# Patient Record
Sex: MALE | Race: WHITE | NOT HISPANIC OR LATINO | Employment: UNEMPLOYED | URBAN - METROPOLITAN AREA
[De-identification: names, ages, dates, MRNs, and addresses within clinical notes are randomized per-mention and may not be internally consistent; named-entity substitution may affect disease eponyms.]

---

## 2019-12-05 ENCOUNTER — HOSPITAL ENCOUNTER (INPATIENT)
Facility: CLINIC | Age: 44
LOS: 3 days | Discharge: HOME | End: 2019-12-08
Attending: FAMILY MEDICINE
Payer: COMMERCIAL

## 2019-12-05 ENCOUNTER — APPOINTMENT (OUTPATIENT)
Dept: CT IMAGING | Facility: CLINIC | Age: 44
End: 2019-12-05
Attending: FAMILY MEDICINE
Payer: COMMERCIAL

## 2019-12-05 DIAGNOSIS — F10.939 ALCOHOL WITHDRAWAL SYNDROME WITH COMPLICATION (H): ICD-10-CM

## 2019-12-05 DIAGNOSIS — D69.6 THROMBOCYTOPENIA (H): ICD-10-CM

## 2019-12-05 DIAGNOSIS — I10 BENIGN ESSENTIAL HYPERTENSION: Chronic | ICD-10-CM
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
## 2019-12-05 DIAGNOSIS — R93.0 LEFT MAXILLARY SINUS OPACIFICATION: ICD-10-CM

## 2019-12-05 DIAGNOSIS — R74.01 TRANSAMINITIS: ICD-10-CM

## 2019-12-05 DIAGNOSIS — D64.9 NORMOCYTIC ANEMIA: ICD-10-CM

## 2019-12-05 DIAGNOSIS — E87.1 HYPONATREMIA: ICD-10-CM

## 2019-12-05 DIAGNOSIS — R79.89 LFT ELEVATION: ICD-10-CM

## 2019-12-05 DIAGNOSIS — D64.9 ANEMIA, UNSPECIFIED TYPE: ICD-10-CM

## 2019-12-05 DIAGNOSIS — M27.40 CYST OF MANDIBLE: ICD-10-CM

## 2019-12-05 DIAGNOSIS — F10.10 ALCOHOL ABUSE: Primary | ICD-10-CM

## 2019-12-05 DIAGNOSIS — S02.2XXA CLOSED FRACTURE OF NASAL BONE, INITIAL ENCOUNTER: ICD-10-CM

## 2019-12-05 DIAGNOSIS — K04.8: ICD-10-CM

## 2019-12-05 DIAGNOSIS — R56.9 SEIZURE (H): ICD-10-CM

## 2019-12-05 DIAGNOSIS — F10.930 ALCOHOL WITHDRAWAL SYNDROME WITHOUT COMPLICATION (H): ICD-10-CM

## 2019-12-05 LAB
ALBUMIN SERPL-MCNC: 3.9 G/DL (ref 3.4–5)
ALBUMIN UR-MCNC: 30 MG/DL
ALP SERPL-CCNC: 126 U/L (ref 40–150)
ALT SERPL W P-5'-P-CCNC: 147 U/L (ref 0–70)
AMPHETAMINES UR QL SCN: NEGATIVE
ANION GAP SERPL CALCULATED.3IONS-SCNC: 12 MMOL/L (ref 3–14)
APPEARANCE UR: CLEAR
AST SERPL W P-5'-P-CCNC: 175 U/L (ref 0–45)
BARBITURATES UR QL: NEGATIVE
BASOPHILS # BLD AUTO: 0 10E9/L (ref 0–0.2)
BASOPHILS NFR BLD AUTO: 0.4 %
BENZODIAZ UR QL: NEGATIVE
BILIRUB SERPL-MCNC: 1.3 MG/DL (ref 0.2–1.3)
BILIRUB UR QL STRIP: NEGATIVE
BUN SERPL-MCNC: 17 MG/DL (ref 7–30)
CALCIUM SERPL-MCNC: 8.8 MG/DL (ref 8.5–10.1)
CANNABINOIDS UR QL SCN: NEGATIVE
CHLORIDE SERPL-SCNC: 79 MMOL/L (ref 94–109)
CO2 SERPL-SCNC: 31 MMOL/L (ref 20–32)
COCAINE UR QL: NEGATIVE
COLOR UR AUTO: YELLOW
CREAT SERPL-MCNC: 0.96 MG/DL (ref 0.66–1.25)
DIFFERENTIAL METHOD BLD: ABNORMAL
EOSINOPHIL # BLD AUTO: 0 10E9/L (ref 0–0.7)
EOSINOPHIL NFR BLD AUTO: 0.7 %
ERYTHROCYTE [DISTWIDTH] IN BLOOD BY AUTOMATED COUNT: 12.9 % (ref 10–15)
ETHANOL SERPL-MCNC: <0.01 G/DL
GFR SERPL CREATININE-BSD FRML MDRD: >90 ML/MIN/{1.73_M2}
GLUCOSE SERPL-MCNC: 99 MG/DL (ref 70–99)
GLUCOSE UR STRIP-MCNC: NEGATIVE MG/DL
HCT VFR BLD AUTO: 36.6 % (ref 40–53)
HGB BLD-MCNC: 13.1 G/DL (ref 13.3–17.7)
HGB UR QL STRIP: ABNORMAL
HYALINE CASTS #/AREA URNS LPF: 1 /LPF (ref 0–2)
IMM GRANULOCYTES # BLD: 0 10E9/L (ref 0–0.4)
IMM GRANULOCYTES NFR BLD: 0.4 %
KETONES UR STRIP-MCNC: 20 MG/DL
LEUKOCYTE ESTERASE UR QL STRIP: NEGATIVE
LIPASE SERPL-CCNC: 1090 U/L (ref 73–393)
LYMPHOCYTES # BLD AUTO: 0.7 10E9/L (ref 0.8–5.3)
LYMPHOCYTES NFR BLD AUTO: 15.4 %
MCH RBC QN AUTO: 34.3 PG (ref 26.5–33)
MCHC RBC AUTO-ENTMCNC: 35.8 G/DL (ref 31.5–36.5)
MCV RBC AUTO: 96 FL (ref 78–100)
MONOCYTES # BLD AUTO: 0.4 10E9/L (ref 0–1.3)
MONOCYTES NFR BLD AUTO: 8 %
NEUTROPHILS # BLD AUTO: 3.5 10E9/L (ref 1.6–8.3)
NEUTROPHILS NFR BLD AUTO: 75.1 %
NITRATE UR QL: NEGATIVE
NRBC # BLD AUTO: 0 10*3/UL
NRBC BLD AUTO-RTO: 0 /100
OPIATES UR QL SCN: NEGATIVE
PCP UR QL SCN: NEGATIVE
PH UR STRIP: 6 PH (ref 5–7)
PLATELET # BLD AUTO: 127 10E9/L (ref 150–450)
POTASSIUM SERPL-SCNC: 3.3 MMOL/L (ref 3.4–5.3)
PROT SERPL-MCNC: 7.3 G/DL (ref 6.8–8.8)
RBC # BLD AUTO: 3.82 10E12/L (ref 4.4–5.9)
RBC #/AREA URNS AUTO: 1 /HPF (ref 0–2)
SODIUM SERPL-SCNC: 122 MMOL/L (ref 133–144)
SOURCE: ABNORMAL
SP GR UR STRIP: 1.01 (ref 1–1.03)
UROBILINOGEN UR STRIP-MCNC: 2 MG/DL (ref 0–2)
WBC # BLD AUTO: 4.6 10E9/L (ref 4–11)
WBC #/AREA URNS AUTO: <1 /HPF (ref 0–5)

## 2019-12-05 PROCEDURE — 70486 CT MAXILLOFACIAL W/O DYE: CPT

## 2019-12-05 PROCEDURE — HZ2ZZZZ DETOXIFICATION SERVICES FOR SUBSTANCE ABUSE TREATMENT: ICD-10-PCS | Performed by: FAMILY MEDICINE

## 2019-12-05 PROCEDURE — 80307 DRUG TEST PRSMV CHEM ANLYZR: CPT | Performed by: FAMILY MEDICINE

## 2019-12-05 PROCEDURE — 87640 STAPH A DNA AMP PROBE: CPT | Performed by: INTERNAL MEDICINE

## 2019-12-05 PROCEDURE — 80320 DRUG SCREEN QUANTALCOHOLS: CPT | Performed by: FAMILY MEDICINE

## 2019-12-05 PROCEDURE — 70450 CT HEAD/BRAIN W/O DYE: CPT

## 2019-12-05 PROCEDURE — 96374 THER/PROPH/DIAG INJ IV PUSH: CPT | Performed by: FAMILY MEDICINE

## 2019-12-05 PROCEDURE — 99291 CRITICAL CARE FIRST HOUR: CPT | Mod: 25 | Performed by: FAMILY MEDICINE

## 2019-12-05 PROCEDURE — 96376 TX/PRO/DX INJ SAME DRUG ADON: CPT | Performed by: FAMILY MEDICINE

## 2019-12-05 PROCEDURE — 96361 HYDRATE IV INFUSION ADD-ON: CPT | Performed by: FAMILY MEDICINE

## 2019-12-05 PROCEDURE — 25800030 ZZH RX IP 258 OP 636: Performed by: FAMILY MEDICINE

## 2019-12-05 PROCEDURE — 12000011 ZZH R&B MS OVERFLOW

## 2019-12-05 PROCEDURE — 99285 EMERGENCY DEPT VISIT HI MDM: CPT | Mod: 25 | Performed by: FAMILY MEDICINE

## 2019-12-05 PROCEDURE — 25000128 H RX IP 250 OP 636: Performed by: FAMILY MEDICINE

## 2019-12-05 PROCEDURE — 80053 COMPREHEN METABOLIC PANEL: CPT | Performed by: FAMILY MEDICINE

## 2019-12-05 PROCEDURE — 83690 ASSAY OF LIPASE: CPT | Performed by: PHYSICIAN ASSISTANT

## 2019-12-05 PROCEDURE — 85025 COMPLETE CBC W/AUTO DIFF WBC: CPT | Performed by: FAMILY MEDICINE

## 2019-12-05 PROCEDURE — 87641 MR-STAPH DNA AMP PROBE: CPT | Performed by: INTERNAL MEDICINE

## 2019-12-05 PROCEDURE — 99223 1ST HOSP IP/OBS HIGH 75: CPT | Mod: AI | Performed by: PHYSICIAN ASSISTANT

## 2019-12-05 PROCEDURE — 81001 URINALYSIS AUTO W/SCOPE: CPT | Performed by: FAMILY MEDICINE

## 2019-12-05 RX ORDER — POTASSIUM CHLORIDE 1500 MG/1
20-40 TABLET, EXTENDED RELEASE ORAL
Status: DISCONTINUED | OUTPATIENT
Start: 2019-12-05 | End: 2019-12-08 | Stop reason: HOSPADM

## 2019-12-05 RX ORDER — LORAZEPAM 1 MG/1
1-2 TABLET ORAL EVERY 30 MIN PRN
Status: DISCONTINUED | OUTPATIENT
Start: 2019-12-05 | End: 2019-12-08 | Stop reason: HOSPADM

## 2019-12-05 RX ORDER — NORTRIPTYLINE HCL 10 MG
10 CAPSULE ORAL DAILY
Status: DISCONTINUED | OUTPATIENT
Start: 2019-12-06 | End: 2019-12-08 | Stop reason: HOSPADM

## 2019-12-05 RX ORDER — BUPRENORPHINE HYDROCHLORIDE AND NALOXONE HYDROCHLORIDE DIHYDRATE 8; 2 MG/1; MG/1
1 TABLET SUBLINGUAL 2 TIMES DAILY
COMMUNITY
Start: 2015-07-17

## 2019-12-05 RX ORDER — LIDOCAINE 40 MG/G
CREAM TOPICAL
Status: DISCONTINUED | OUTPATIENT
Start: 2019-12-05 | End: 2019-12-08 | Stop reason: HOSPADM

## 2019-12-05 RX ORDER — LIDOCAINE HYDROCHLORIDE 20 MG/ML
JELLY TOPICAL ONCE
Status: DISCONTINUED | OUTPATIENT
Start: 2019-12-05 | End: 2019-12-05

## 2019-12-05 RX ORDER — SODIUM CHLORIDE, SODIUM LACTATE, POTASSIUM CHLORIDE, CALCIUM CHLORIDE 600; 310; 30; 20 MG/100ML; MG/100ML; MG/100ML; MG/100ML
1000 INJECTION, SOLUTION INTRAVENOUS CONTINUOUS
Status: DISCONTINUED | OUTPATIENT
Start: 2019-12-05 | End: 2019-12-05

## 2019-12-05 RX ORDER — POTASSIUM CHLORIDE 1.5 G/1.58G
20-40 POWDER, FOR SOLUTION ORAL
Status: DISCONTINUED | OUTPATIENT
Start: 2019-12-05 | End: 2019-12-08 | Stop reason: HOSPADM

## 2019-12-05 RX ORDER — ONDANSETRON 2 MG/ML
4 INJECTION INTRAMUSCULAR; INTRAVENOUS EVERY 6 HOURS PRN
Status: DISCONTINUED | OUTPATIENT
Start: 2019-12-05 | End: 2019-12-08 | Stop reason: HOSPADM

## 2019-12-05 RX ORDER — LANOLIN ALCOHOL/MO/W.PET/CERES
100 CREAM (GRAM) TOPICAL DAILY
Status: DISCONTINUED | OUTPATIENT
Start: 2019-12-06 | End: 2019-12-06 | Stop reason: CLARIF

## 2019-12-05 RX ORDER — ONDANSETRON 4 MG/1
4 TABLET, ORALLY DISINTEGRATING ORAL EVERY 6 HOURS PRN
Status: DISCONTINUED | OUTPATIENT
Start: 2019-12-05 | End: 2019-12-08 | Stop reason: HOSPADM

## 2019-12-05 RX ORDER — LORAZEPAM 2 MG/ML
1 INJECTION INTRAMUSCULAR ONCE
Status: COMPLETED | OUTPATIENT
Start: 2019-12-05 | End: 2019-12-05

## 2019-12-05 RX ORDER — POTASSIUM CL/LIDO/0.9 % NACL 10MEQ/0.1L
10 INTRAVENOUS SOLUTION, PIGGYBACK (ML) INTRAVENOUS
Status: DISCONTINUED | OUTPATIENT
Start: 2019-12-05 | End: 2019-12-08 | Stop reason: HOSPADM

## 2019-12-05 RX ORDER — PROCHLORPERAZINE 25 MG
25 SUPPOSITORY, RECTAL RECTAL EVERY 12 HOURS PRN
Status: DISCONTINUED | OUTPATIENT
Start: 2019-12-05 | End: 2019-12-08 | Stop reason: HOSPADM

## 2019-12-05 RX ORDER — LISINOPRIL AND HYDROCHLOROTHIAZIDE 12.5; 2 MG/1; MG/1
1 TABLET ORAL DAILY
COMMUNITY
Start: 2016-01-18

## 2019-12-05 RX ORDER — POTASSIUM CHLORIDE 7.45 MG/ML
10 INJECTION INTRAVENOUS
Status: DISCONTINUED | OUTPATIENT
Start: 2019-12-05 | End: 2019-12-08 | Stop reason: HOSPADM

## 2019-12-05 RX ORDER — ACETAMINOPHEN 650 MG/1
650 SUPPOSITORY RECTAL EVERY 4 HOURS PRN
Status: DISCONTINUED | OUTPATIENT
Start: 2019-12-05 | End: 2019-12-08 | Stop reason: HOSPADM

## 2019-12-05 RX ORDER — MULTIPLE VITAMINS W/ MINERALS TAB 9MG-400MCG
1 TAB ORAL DAILY
Status: DISCONTINUED | OUTPATIENT
Start: 2019-12-06 | End: 2019-12-08 | Stop reason: HOSPADM

## 2019-12-05 RX ORDER — ACETAMINOPHEN 325 MG/1
650 TABLET ORAL EVERY 4 HOURS PRN
Status: DISCONTINUED | OUTPATIENT
Start: 2019-12-05 | End: 2019-12-08 | Stop reason: HOSPADM

## 2019-12-05 RX ORDER — PROCHLORPERAZINE MALEATE 10 MG
10 TABLET ORAL EVERY 6 HOURS PRN
Status: DISCONTINUED | OUTPATIENT
Start: 2019-12-05 | End: 2019-12-08 | Stop reason: HOSPADM

## 2019-12-05 RX ORDER — NALOXONE HYDROCHLORIDE 0.4 MG/ML
.1-.4 INJECTION, SOLUTION INTRAMUSCULAR; INTRAVENOUS; SUBCUTANEOUS
Status: DISCONTINUED | OUTPATIENT
Start: 2019-12-05 | End: 2019-12-08 | Stop reason: HOSPADM

## 2019-12-05 RX ORDER — LORAZEPAM 2 MG/ML
1-2 INJECTION INTRAMUSCULAR EVERY 30 MIN PRN
Status: DISCONTINUED | OUTPATIENT
Start: 2019-12-05 | End: 2019-12-08 | Stop reason: HOSPADM

## 2019-12-05 RX ORDER — QUETIAPINE FUMARATE 25 MG/1
25-50 TABLET, FILM COATED ORAL EVERY 6 HOURS PRN
Status: DISCONTINUED | OUTPATIENT
Start: 2019-12-05 | End: 2019-12-08 | Stop reason: HOSPADM

## 2019-12-05 RX ORDER — BUPRENORPHINE HYDROCHLORIDE AND NALOXONE HYDROCHLORIDE DIHYDRATE 2; .5 MG/1; MG/1
1 TABLET SUBLINGUAL 2 TIMES DAILY
Status: DISCONTINUED | OUTPATIENT
Start: 2019-12-06 | End: 2019-12-08 | Stop reason: HOSPADM

## 2019-12-05 RX ORDER — FOLIC ACID 1 MG/1
1 TABLET ORAL DAILY
Status: DISCONTINUED | OUTPATIENT
Start: 2019-12-06 | End: 2019-12-06 | Stop reason: CLARIF

## 2019-12-05 RX ORDER — FINASTERIDE 5 MG/1
5 TABLET, FILM COATED ORAL
COMMUNITY
Start: 2016-04-26 | End: 2019-12-05

## 2019-12-05 RX ORDER — NORTRIPTYLINE HCL 10 MG
10 CAPSULE ORAL DAILY
COMMUNITY
Start: 2015-06-19

## 2019-12-05 RX ORDER — POTASSIUM CHLORIDE 29.8 MG/ML
20 INJECTION INTRAVENOUS
Status: DISCONTINUED | OUTPATIENT
Start: 2019-12-05 | End: 2019-12-05 | Stop reason: CLARIF

## 2019-12-05 RX ORDER — SODIUM CHLORIDE 9 MG/ML
INJECTION, SOLUTION INTRAVENOUS CONTINUOUS
Status: DISCONTINUED | OUTPATIENT
Start: 2019-12-05 | End: 2019-12-06

## 2019-12-05 RX ORDER — MULTIVIT-MIN/IRON/FOLIC ACID/K 18-600-40
100 CAPSULE ORAL DAILY
COMMUNITY

## 2019-12-05 RX ADMIN — LORAZEPAM 1 MG: 2 INJECTION INTRAMUSCULAR; INTRAVENOUS at 18:51

## 2019-12-05 RX ADMIN — SODIUM CHLORIDE, POTASSIUM CHLORIDE, SODIUM LACTATE AND CALCIUM CHLORIDE 1000 ML: 600; 310; 30; 20 INJECTION, SOLUTION INTRAVENOUS at 19:42

## 2019-12-05 RX ADMIN — SODIUM CHLORIDE 1000 ML: 9 INJECTION, SOLUTION INTRAVENOUS at 18:52

## 2019-12-05 RX ADMIN — LORAZEPAM 1 MG: 2 INJECTION INTRAMUSCULAR; INTRAVENOUS at 19:59

## 2019-12-05 ASSESSMENT — MIFFLIN-ST. JEOR
SCORE: 1656.75
SCORE: 1659.22

## 2019-12-06 PROBLEM — I10 BENIGN ESSENTIAL HYPERTENSION: Status: ACTIVE | Noted: 2019-12-06

## 2019-12-06 PROBLEM — F11.20 NARCOTIC DEPENDENCE (H): Status: ACTIVE | Noted: 2019-12-06

## 2019-12-06 PROBLEM — I10 BENIGN ESSENTIAL HYPERTENSION: Chronic | Status: ACTIVE | Noted: 2019-12-06

## 2019-12-06 PROBLEM — D64.9 NORMOCYTIC ANEMIA: Status: ACTIVE | Noted: 2019-12-06

## 2019-12-06 PROBLEM — M54.9 CHRONIC BACK PAIN: Chronic | Status: ACTIVE | Noted: 2019-12-06

## 2019-12-06 PROBLEM — F41.9 ANXIETY DISORDER: Status: ACTIVE | Noted: 2019-12-06

## 2019-12-06 PROBLEM — G89.4 CHRONIC PAIN DISORDER: Chronic | Status: ACTIVE | Noted: 2019-12-06

## 2019-12-06 PROBLEM — E87.1 HYPONATREMIA: Status: ACTIVE | Noted: 2019-12-06

## 2019-12-06 PROBLEM — R79.89 LFT ELEVATION: Status: ACTIVE | Noted: 2019-12-06

## 2019-12-06 PROBLEM — D69.6 THROMBOCYTOPENIA (H): Status: ACTIVE | Noted: 2019-12-06

## 2019-12-06 PROBLEM — F10.10 ALCOHOL ABUSE: Status: ACTIVE | Noted: 2019-12-06

## 2019-12-06 PROBLEM — F17.210 CIGARETTE NICOTINE DEPENDENCE WITHOUT COMPLICATION: Status: ACTIVE | Noted: 2019-12-06

## 2019-12-06 PROBLEM — F32.A DEPRESSION: Status: ACTIVE | Noted: 2019-12-06

## 2019-12-06 PROBLEM — F41.9 ANXIETY DISORDER: Chronic | Status: ACTIVE | Noted: 2019-12-06

## 2019-12-06 PROBLEM — F10.239 ALCOHOL DEPENDENCE WITH WITHDRAWAL (H): Status: ACTIVE | Noted: 2019-12-06

## 2019-12-06 PROBLEM — R93.0 LEFT MAXILLARY SINUS OPACIFICATION: Status: ACTIVE | Noted: 2019-12-06

## 2019-12-06 PROBLEM — G89.29 CHRONIC BACK PAIN: Chronic | Status: ACTIVE | Noted: 2019-12-06

## 2019-12-06 PROBLEM — K04.8: Status: ACTIVE | Noted: 2019-12-06

## 2019-12-06 PROBLEM — F10.939 ALCOHOL WITHDRAWAL (H): Status: ACTIVE | Noted: 2019-12-06

## 2019-12-06 LAB
ANION GAP SERPL CALCULATED.3IONS-SCNC: 8 MMOL/L (ref 3–14)
BUN SERPL-MCNC: 13 MG/DL (ref 7–30)
CALCIUM SERPL-MCNC: 8.7 MG/DL (ref 8.5–10.1)
CHLORIDE SERPL-SCNC: 91 MMOL/L (ref 94–109)
CO2 SERPL-SCNC: 32 MMOL/L (ref 20–32)
CREAT SERPL-MCNC: 0.87 MG/DL (ref 0.66–1.25)
ERYTHROCYTE [DISTWIDTH] IN BLOOD BY AUTOMATED COUNT: 13 % (ref 10–15)
GFR SERPL CREATININE-BSD FRML MDRD: >90 ML/MIN/{1.73_M2}
GLUCOSE SERPL-MCNC: 82 MG/DL (ref 70–99)
HCT VFR BLD AUTO: 33.5 % (ref 40–53)
HGB BLD-MCNC: 11.9 G/DL (ref 13.3–17.7)
LIPASE SERPL-CCNC: 916 U/L (ref 73–393)
MAGNESIUM SERPL-MCNC: 0.8 MG/DL (ref 1.6–2.3)
MAGNESIUM SERPL-MCNC: 2.1 MG/DL (ref 1.6–2.3)
MCH RBC QN AUTO: 34.5 PG (ref 26.5–33)
MCHC RBC AUTO-ENTMCNC: 35.5 G/DL (ref 31.5–36.5)
MCV RBC AUTO: 97 FL (ref 78–100)
MRSA DNA SPEC QL NAA+PROBE: NEGATIVE
PLATELET # BLD AUTO: 102 10E9/L (ref 150–450)
POTASSIUM SERPL-SCNC: 3.8 MMOL/L (ref 3.4–5.3)
RBC # BLD AUTO: 3.45 10E12/L (ref 4.4–5.9)
SODIUM SERPL-SCNC: 128 MMOL/L (ref 133–144)
SODIUM SERPL-SCNC: 130 MMOL/L (ref 133–144)
SODIUM SERPL-SCNC: 131 MMOL/L (ref 133–144)
SODIUM SERPL-SCNC: 131 MMOL/L (ref 133–144)
SPECIMEN SOURCE: NORMAL
WBC # BLD AUTO: 4.6 10E9/L (ref 4–11)

## 2019-12-06 PROCEDURE — 25000125 ZZHC RX 250: Performed by: PHYSICIAN ASSISTANT

## 2019-12-06 PROCEDURE — 25000128 H RX IP 250 OP 636: Performed by: INTERNAL MEDICINE

## 2019-12-06 PROCEDURE — 25000132 ZZH RX MED GY IP 250 OP 250 PS 637: Performed by: PHYSICIAN ASSISTANT

## 2019-12-06 PROCEDURE — 25800030 ZZH RX IP 258 OP 636: Performed by: INTERNAL MEDICINE

## 2019-12-06 PROCEDURE — 83735 ASSAY OF MAGNESIUM: CPT | Performed by: INTERNAL MEDICINE

## 2019-12-06 PROCEDURE — 99233 SBSQ HOSP IP/OBS HIGH 50: CPT | Performed by: INTERNAL MEDICINE

## 2019-12-06 PROCEDURE — 83690 ASSAY OF LIPASE: CPT | Performed by: INTERNAL MEDICINE

## 2019-12-06 PROCEDURE — 25800030 ZZH RX IP 258 OP 636: Performed by: PHYSICIAN ASSISTANT

## 2019-12-06 PROCEDURE — 36415 COLL VENOUS BLD VENIPUNCTURE: CPT | Performed by: PHYSICIAN ASSISTANT

## 2019-12-06 PROCEDURE — 25000128 H RX IP 250 OP 636: Performed by: PHYSICIAN ASSISTANT

## 2019-12-06 PROCEDURE — 80048 BASIC METABOLIC PNL TOTAL CA: CPT | Performed by: INTERNAL MEDICINE

## 2019-12-06 PROCEDURE — 84295 ASSAY OF SERUM SODIUM: CPT | Performed by: PHYSICIAN ASSISTANT

## 2019-12-06 PROCEDURE — 85027 COMPLETE CBC AUTOMATED: CPT | Performed by: INTERNAL MEDICINE

## 2019-12-06 PROCEDURE — 36415 COLL VENOUS BLD VENIPUNCTURE: CPT | Performed by: INTERNAL MEDICINE

## 2019-12-06 PROCEDURE — 84295 ASSAY OF SERUM SODIUM: CPT | Performed by: INTERNAL MEDICINE

## 2019-12-06 PROCEDURE — 80048 BASIC METABOLIC PNL TOTAL CA: CPT | Performed by: PHYSICIAN ASSISTANT

## 2019-12-06 PROCEDURE — 83735 ASSAY OF MAGNESIUM: CPT | Performed by: PHYSICIAN ASSISTANT

## 2019-12-06 PROCEDURE — 12000011 ZZH R&B MS OVERFLOW

## 2019-12-06 RX ORDER — DESMOPRESSIN ACETATE 4 UG/ML
2 INJECTION, SOLUTION INTRAVENOUS; SUBCUTANEOUS ONCE
Status: DISCONTINUED | OUTPATIENT
Start: 2019-12-06 | End: 2019-12-06

## 2019-12-06 RX ORDER — THIAMINE HYDROCHLORIDE 100 MG/ML
100 INJECTION, SOLUTION INTRAMUSCULAR; INTRAVENOUS DAILY
Status: DISCONTINUED | OUTPATIENT
Start: 2019-12-07 | End: 2019-12-07

## 2019-12-06 RX ORDER — FOLIC ACID 5 MG/ML
1 INJECTION, SOLUTION INTRAMUSCULAR; INTRAVENOUS; SUBCUTANEOUS DAILY
Status: DISCONTINUED | OUTPATIENT
Start: 2019-12-07 | End: 2019-12-07

## 2019-12-06 RX ORDER — LISINOPRIL 20 MG/1
20 TABLET ORAL DAILY
Status: DISCONTINUED | OUTPATIENT
Start: 2019-12-06 | End: 2019-12-08 | Stop reason: HOSPADM

## 2019-12-06 RX ORDER — DESMOPRESSIN ACETATE 4 UG/ML
2 INJECTION, SOLUTION INTRAVENOUS; SUBCUTANEOUS
Status: DISCONTINUED | OUTPATIENT
Start: 2019-12-06 | End: 2019-12-06

## 2019-12-06 RX ORDER — MAGNESIUM SULFATE HEPTAHYDRATE 40 MG/ML
4 INJECTION, SOLUTION INTRAVENOUS EVERY 4 HOURS PRN
Status: DISCONTINUED | OUTPATIENT
Start: 2019-12-06 | End: 2019-12-08 | Stop reason: HOSPADM

## 2019-12-06 RX ADMIN — NICOTINE 1 PATCH: 7 PATCH TRANSDERMAL at 01:20

## 2019-12-06 RX ADMIN — NICOTINE 1 PATCH: 7 PATCH TRANSDERMAL at 09:49

## 2019-12-06 RX ADMIN — Medication 100 MG: at 09:49

## 2019-12-06 RX ADMIN — DEXTROSE MONOHYDRATE 1000 ML: 50 INJECTION, SOLUTION INTRAVENOUS at 11:18

## 2019-12-06 RX ADMIN — LISINOPRIL 20 MG: 20 TABLET ORAL at 09:48

## 2019-12-06 RX ADMIN — FOLIC ACID: 5 INJECTION, SOLUTION INTRAMUSCULAR; INTRAVENOUS; SUBCUTANEOUS at 01:16

## 2019-12-06 RX ADMIN — BUPRENORPHINE HYDROCHLORIDE AND NALOXONE HYDROCHLORIDE DIHYDRATE 1 TABLET: 2; .5 TABLET SUBLINGUAL at 09:48

## 2019-12-06 RX ADMIN — BUPRENORPHINE HYDROCHLORIDE AND NALOXONE HYDROCHLORIDE DIHYDRATE 1 TABLET: 2; .5 TABLET SUBLINGUAL at 20:39

## 2019-12-06 RX ADMIN — MULTIPLE VITAMINS W/ MINERALS TAB 1 TABLET: TAB at 09:49

## 2019-12-06 RX ADMIN — DEXTROSE MONOHYDRATE 500 ML: 50 INJECTION, SOLUTION INTRAVENOUS at 15:48

## 2019-12-06 RX ADMIN — BUPRENORPHINE HYDROCHLORIDE AND NALOXONE HYDROCHLORIDE DIHYDRATE 1 TABLET: 2; .5 TABLET SUBLINGUAL at 01:15

## 2019-12-06 RX ADMIN — DESMOPRESSIN ACETATE: 4 SOLUTION INTRAVENOUS at 12:17

## 2019-12-06 RX ADMIN — POTASSIUM CHLORIDE 40 MEQ: 20 TABLET, EXTENDED RELEASE ORAL at 01:16

## 2019-12-06 RX ADMIN — DESMOPRESSIN ACETATE: 4 SOLUTION INTRAVENOUS at 02:37

## 2019-12-06 RX ADMIN — FLUOXETINE 20 MG: 20 CAPSULE ORAL at 09:48

## 2019-12-06 RX ADMIN — NORTRIPTYLINE HYDROCHLORIDE 10 MG: 10 CAPSULE ORAL at 20:39

## 2019-12-06 RX ADMIN — LORAZEPAM 1 MG: 1 TABLET ORAL at 01:15

## 2019-12-06 RX ADMIN — POTASSIUM CHLORIDE 20 MEQ: 20 TABLET, EXTENDED RELEASE ORAL at 03:10

## 2019-12-06 RX ADMIN — LORAZEPAM 1 MG: 1 TABLET ORAL at 03:19

## 2019-12-06 RX ADMIN — DEXTROSE MONOHYDRATE 500 ML: 50 INJECTION, SOLUTION INTRAVENOUS at 03:08

## 2019-12-06 RX ADMIN — NICOTINE POLACRILEX 2 MG: 2 GUM, CHEWING ORAL at 20:39

## 2019-12-06 RX ADMIN — FOLIC ACID 1 MG: 1 TABLET ORAL at 09:48

## 2019-12-06 RX ADMIN — MAGNESIUM SULFATE 4 G: 4 INJECTION INTRAVENOUS at 01:16

## 2019-12-06 SDOH — HEALTH STABILITY: MENTAL HEALTH: HOW MANY STANDARD DRINKS CONTAINING ALCOHOL DO YOU HAVE ON A TYPICAL DAY?: 3 OR 4

## 2019-12-06 SDOH — HEALTH STABILITY: MENTAL HEALTH: HOW OFTEN DO YOU HAVE A DRINK CONTAINING ALCOHOL?: 4 OR MORE TIMES A WEEK

## 2019-12-06 ASSESSMENT — ACTIVITIES OF DAILY LIVING (ADL)
ADLS_ACUITY_SCORE: 14
ADLS_ACUITY_SCORE: 14
ADLS_ACUITY_SCORE: 13
ADLS_ACUITY_SCORE: 14
ADLS_ACUITY_SCORE: 14
ADLS_ACUITY_SCORE: 13

## 2019-12-06 NOTE — PROGRESS NOTES
Recheck Na 131  Goal sodium is < 130 by 6:30 pm on 12/6 to avoid rapid overcorrection  D5W 1000cc over 2 hours  Repeat DDAVP 2mg x1  Recheck Na 3 hours    Recent Labs   Lab Test 12/06/19  1003 12/06/19  0002 12/05/19  1839   * 131* 122*   POTASSIUM 3.8  --  3.3*   CHLORIDE 91*  --  79*   CO2 32  --  31   ANIONGAP 8  --  12   GLC 82  --  99   BUN 13  --  17   CR 0.87  --  0.96   DIONNE 8.7  --  8.8

## 2019-12-06 NOTE — H&P
"[facility]    History and Physical - Hospitalist Service       Date of Admission:  12/5/2019    Assessment & Plan   Lester Winchester is a 44 year old male admitted on 12/5/2019. He is being admitted for evaluation and treatment after having a seizure, likely due to alcohol withdrawal.    Seizure, likely due to alcohol withdrawal  Denies any prior history of seizures. Witnessed seizure most likely due to alcohol withdrawal (see below), although also hyponatremic which may have contributed as well. Head CT negative for acute intracranial abnormalities. Has some facial/sinus abnormalities, see below. Was given Ativan 1 mg x 2 in the emergency department.   - Address withdrawal as below  - No antiepileptic medications indicated at this time  - Seizure precautions     Alcohol dependence with withdrawal  Reports drinking daily - 2 \"Crown & 7's\" and 2-3 beers daily, last drink was evening on 12/3. Denies any prior hospitalizations for withdrawal, but admits to getting shaky when he does not drink. Tremulous on exam, tachycardic.  - [facility] protocol  - IV vitamins, then oral    Hyponatremia  Admit sodium 122. Was given 1L NS bolus in the emergency department, sodium overcorrected to 131. Suspect hyponatremia multifactorial due to alcohol, thiazide use (although not a new medication), and possible poor intake from recent GI illness.  - Sodium check q 6 hours  - DDAVP and D5W bolus ordered (unable to give until around 3 am due to current Mg replacement), will recheck sodium at 6 am  - Goal sodium is < 130 by 6:30 pm on 12/6 to avoid rapid overcorrection    Left maxillary sinus opacification  Noted on facial CT, most consistent with chronic sinusitis. However, cannot rule out occult blow out fracture, so be watchful of development of double vision or other vision changes.  - Monitor for vision changes    Dental root cyst vs abscess or other abnormality  Incidental finding noted on CT face - \"Nonspecific 1.6 " "cm cystic lesion surrounding the roots of tooth numbers 15 and 16. Differential diagnosis includes periapical cyst, periapical abscess, periapical granuloma, keratocystic odontogenic tumor, or less likely ameloblastoma.\" Patient denies current dental pain, no obvious abnormality on exam, however he does mention occasional tooth pain. Afebrile, no leukocytosis on admission.   - No antibiotics indicated currently, but consider further evaluation and work-up if he becomes febrile or develops leukocytosis  - May need referral to OMFS    LFT elevation  Elevated lipase  Admit , . Lipase 1090. ost likely related to alcohol use. Patient is non-tender with abdominal palpation, no evidence of overt pancreatitis. Received 3L IV fluids in the emergency department.  - CMP and lipase in am   -  Alcohol cessation  - Maintenance LR @ 125 ml/hr    Thrombocytopenia  Admit platelets 129. Likely suppressed due to chronic alcohol.  - Alcohol cessation  - CBC in am    Normocytic anemia  Admit hemoglobin 13.1, MCV 96. No evidence of acute bleed (other than minor facial laceration bleeding).  - Defer further work-up to day team (as some of the Hospitalist team prefer this to be done as outpatient)    Hypokalemia / hypomagnesemia  Admit K = 3.3, Mg 0.8.  - Replacement protocol    Recent GI illness  Had reported nausea, vomiting, and diarrhea on 12/4, emesis x 1 on 12/5. Now seems to be resolved. Patient did not drink alcohol on 12/4 due to GI illness.  - Monitor for recurrence  - Prn antiemetics available  - Good hand hygiene    Narcotic dependence  Is on daily Suboxone (2/0.5 mg) bid, continue.    Benign essential hypertension  Pressures slightly elevated. Managed prior to admission with lisinopril-hydrochlorothiazide 20/12.5 mg daily. hydrochlorothiazide may be contributing to hyponatremia (although this is not a new medication).  - Hold prior to admission hydrochlorothiazide  - May continue prior to admission lisinopril " "    Anxiety disorder  Managed prior to admission with Prozac 20 mg q am. May possibly be contributing to hyponatremia, but is not a new medication.  - Continue prior to admission Prozac, but discontinue if hyponatremia is not improving    Cigarette nicotine dependence without complication  Nicotine patch available.       Diet: Advance Diet as Tolerated: Clear Liquid Diet    DVT Prophylaxis: Pneumatic Compression Devices  Garrett Catheter: not present  Code Status: Full Code      Disposition Plan   Expected discharge: 2 - 3 days, recommended to prior living arrangement once patient's CIWA scores normalize and no further evidence of withdrawal, hyponatremia and other electrolyte abnormalities improve, stable vitals.  Entered: Shyla Bernal PA-C 12/06/2019, 1:14 AM     The patient's care was discussed with the Attending Physician, Dr. Luis Felipe Wagoner as well as Dr. Cr Freedman and Patient.    Shyla Bernal PA-C  [facility]    ______________________________________________________________________    Chief Complaint   Seizure    History is obtained from the patient, review of EMR, and emergency department sign out from Dr. Carloz Galvez.    History of Present Illness   Lester Winchester is a 44 year old male who presented to the emergency department after having a witnessed seizure while in the waiting room at his Suboxone clinic.    Patient was sitting in a chair and \"the next thing I know, I was on the floor and blood was everywhere.\" EMS had been called and he was brought to the emergency department. He was told by witnesses that he had a seizure and had hit his head as well. He thinks he bit his tongue, denies any urinary or fecal incontinence.    He denies any known prior seizures.     He is tremulous on exam. He admits to daily drinking, usually \"2 Crown-7's per day and 2-3 beers per day.\" He had a GI illness the day prior to admission, as well as one episode of emesis on the morning of " admission, so his last drink was three days ago. He denies any prior hospitalizations for withdrawal but does get shaky if he hasn't had alcohol in a while.    He has some left facial pain and a headache after hitting his head. He has bilateral eye swelling. He denies current dental pain, but occasionally does get pain in his molars of the left upper jaw.     He denies any associated abdominal pain, melena, hematochezia, or hematemesis associated with his recent GI illness. He has had a decreased appetite since his illness. The occasionally gets palpitations and a racing heart, which he noticed today in the emergency department. He denies dysuria but reports occasional difficulty initiating a urinary stream. The remainder review of systems is negative.    Review of Systems    The 10 point Review of Systems is negative other than noted in the HPI or here.     Past Medical History    I have reviewed this patient's medical history and updated it with pertinent information if needed.   Past Medical History:   Diagnosis Date     Anxiety      History of stroke     In childhood, patient does not know many details       Past Surgical History   I have reviewed this patient's surgical history and updated it with pertinent information if needed.  No past surgical history on file.    Social History   I have reviewed this patient's social history and updated it with pertinent information if needed.  Social History     Tobacco Use     Smoking status: Current Every Day Smoker     Packs/day: 0.50     Types: Cigarettes     Smokeless tobacco: Never Used   Substance Use Topics     Alcohol use: Yes     Frequency: 4 or more times a week     Drinks per session: 3 or 4     Comment: Reports 2 Crow 7's and 2-3 beers per day     Drug use: Not Currently     Comment: On suboxone       Family History   I have reviewed this patient's family history and updated it with pertinent information if needed.   Family History   Problem Relation Age of  Onset     Mental Illness Father      Alcoholism Maternal Grandfather        Prior to Admission Medications   Prior to Admission Medications   Prescriptions Last Dose Informant Patient Reported? Taking?   FLUoxetine (PROZAC) 20 MG capsule 12/4/2019 Self Yes Yes   Sig: Take 20 mg by mouth daily   Vitamin D, Cholecalciferol, 25 MCG (1000 UT) TABS 12/5/2019 at Unknown time Self Yes Yes   Sig: Take 100 Units by mouth daily   buprenorphine-naloxone (SUBOXONE) 8-2 MG SUBL sublingual tablet 12/4/2019 at pm Self Yes Yes   Sig: Place 1 tablet under the tongue 2 times daily    lisinopril-hydrochlorothiazide (PRINZIDE/ZESTORETIC) 20-12.5 MG tablet 12/4/2019 Self Yes Yes   Sig: Take 1 tablet by mouth daily   nortriptyline (PAMELOR) 10 MG capsule 12/4/2019 Self Yes Yes   Sig: Take 10 mg by mouth daily      Facility-Administered Medications: None     Allergies   No Known Allergies    Physical Exam   Vital Signs: Temp: 98.7  F (37.1  C) Temp src: Oral BP: 128/82 Pulse: 111 Heart Rate: 98 Resp: 16 SpO2: 94 % O2 Device: None (Room air)    Weight: 179 lbs 7.27 oz    Constitutional: Alert, oriented, cooperative. Anxious appearing and tremulous but not distressed. Appears nontoxic, speaking in full sentences.     Eyes: Bilateral periorbital edema and ecchymosis. No scleral icterus. Extroccular movements intact on the right (difficult to do on the left due to swelling).    HEENT: Oropharynx is clear and moist. Bite lesions on the tongue. Normocephalic. Has evidence of cranial trauma on left forehead.    Cardiovascular: Regular rhythm, rapid rate, normal S1 and S2. No murmur, rubs, or gallops. Peripheral pulses intact bilaterally. No lower extremity edema.    Respiratory: Lung sounds are clear to auscultation bilaterally without wheezes, rhonchi, or crackles.    GI: Somewhat firm but non-distended. Non-tender, no rebound or guarding. No hepatosplenomegaly or masses appreciated. Normal bowel sounds.     Musculoskeletal: Without obvious  deformity, normal range of motion. Normal muscle bulk and tone. Distal CMS intact.      Skin: Warm and dry, no rashes. No mottling of skin. Bilateral periorbital edema, ecchymoses, and erythema present. Also with left forehead laceration that is covered by a bandage (not removed for additional inspection, as emergency department inspected and stated it did not require suture).     Neurologic: Patient moves all extremities. Cranial nerves are grossly intact.  is symmetric. Gross strength and sensation are equal bilaterally. Tremulous. No tongue fasciculations.    Genitourinary: Deferred      Data   Data reviewed today: I reviewed all medications, new labs and imaging results over the last 24 hours. I personally reviewed the facial CT image(s) showing full opacification of left maxillary sinus, partial mucosal thickening of right maxillar sinus, and partial mucosal thickening of left spenoid sinus.    Recent Labs   Lab 12/06/19  0002 12/05/19  1839   WBC  --  4.6   HGB  --  13.1*   MCV  --  96   PLT  --  127*   * 122*   POTASSIUM  --  3.3*   CHLORIDE  --  79*   CO2  --  31   BUN  --  17   CR  --  0.96   ANIONGAP  --  12   DIONNE  --  8.8   GLC  --  99   ALBUMIN  --  3.9   PROTTOTAL  --  7.3   BILITOTAL  --  1.3   ALKPHOS  --  126   ALT  --  147*   AST  --  175*   LIPASE  --  1,090*     Recent Results (from the past 24 hour(s))   CT Head w/o Contrast    Narrative    CT SCAN OF THE HEAD WITHOUT CONTRAST   12/5/2019 7:37 PM     HISTORY: Seizure.    TECHNIQUE:  Axial images of the head and coronal reformations without  IV contrast material. Radiation dose for this scan was reduced using  automated exposure control, adjustment of the mA and/or kV according  to patient size, or iterative reconstruction technique.    COMPARISON: None.    FINDINGS:  The cerebral hemispheres, brainstem, and cerebellum  demonstrate normal morphology and signal. No evidence of acute  ischemia, hemorrhage, mass, mass effect, or  hydrocephalus. The  calvarium, tympanic cavities, mastoid cavities are unremarkable. Left  frontal soft tissue contusion is present. Refer to maxillofacial  report for additional details.      Impression    IMPRESSION:  No acute intracranial abnormality.    NATHALY CHAVEZ MD   CT Maxillofacial w/o Contrast    Narrative    CT FACIAL BONES WITHOUT CONTRAST 12/5/2019 7:37 PM     HISTORY: Seizure. Facial trauma.    TECHNIQUE: Axial CT images of the facial bones were completed with  sagittal and coronal reformations. Radiation dose for this scan was  reduced using automated exposure control, adjustment of the mA and/or  kV according to patient size, or iterative reconstruction technique.     COMPARISON: None.    FINDINGS:   A minimally displaced nasal bone fracture is present with slight  inferior angulation of the anterior nasal bone. The nasal septum is  slightly deviated to the left without displaced fracture identified.  No other fractures are identified. The zygomatic arches, pterygoid  plates, and sphenotemporal buttresses are intact. Mandible and  temporomandibular joints are intact. A 1.6 cm cystic lesion is present  surrounding the roots of tooth numbers 15 and 16. There is complete  opacification of the left maxillary sinus with thickening and  sclerosis of the left maxillary sinus wall suggestive of chronic  sinusitis. Extensive soft tissue contusion and swelling is present  involving the left greater than right forehead and left greater than  right preseptal soft tissues. The underlying orbits are unremarkable.  The globes, lenses, extraocular muscles, orbital fat, and orbital  vasculature are maintained. No evidence of intraorbital hemorrhage.  The visualized intracranial cavity is unremarkable. Refer to head  report for additional details.      Impression    IMPRESSION:   1. Minimally displaced nasal bone fracture.  2. No other fractures are identified.  3. Extensive frontal and periorbital preseptal soft  tissue swelling.  4. No evidence of underlying globe rupture or intraorbital hemorrhage.  5. Complete soft tissue opacification of the left maxillary sinus with  wall thickening/sclerosis suggestive of chronic sinusitis. Underlying  mass lesion or mucocele cannot be excluded. Consider otolaryngology  consult.  6. Nonspecific 1.6 cm cystic lesion surrounding the roots of tooth  numbers 15 and 16. Differential diagnosis includes periapical cyst,  periapical abscess, periapical granuloma, keratocystic odontogenic  tumor, or less likely ameloblastoma.    NATHALY CHAVEZ MD

## 2019-12-06 NOTE — CONSULTS
SW note. Pt was provided with behavioral health resources in his DC instructions, pt is from home and was independent, goal will be for pt to return home when stable. If more immediate needs arise, please contact CTS dept.   Beth PAN, KO, NIC   [facility]  [email or web address]  Phone: [phone]

## 2019-12-06 NOTE — ED PROVIDER NOTES
"  History     Chief Complaint   Patient presents with     Seizures     HPI  Lester Winchester is a 44 year old male with past medical history including tobacco abuse, hypertension, and anxiety who presented to the emergency department after reported tonic/clonic seizures lasting 2 minutes. Patient was at chiropractor clinic for chronic back pain. He was sitting in a chair and \"next thing I knew I was on the ground\". He reports nausea and vomiting yesterday and this morning. He also endorses difficulty voiding but denies dysuria. Of note, patient is taking Suboxone, prescribed by a \"Mike Stock\". He denies recent changes in dosing or taking other narcotics or illicit drugs. He reportedly drinks 1-2 times per week. Sometimes drinking 6 pack at a time, most recently last weekend. He denies drinking the past couple nights. In addition to suboxone, he takes Prozac and an antihypertensive. He denies recent changes to these medications. He denies recent illness, headache, neck pain, extremity weakness, abdominal pain, diarrhea, cough, sore throat, vision changes and double vision.     Past Medical History   No past medical history on file.    Problem List  There is no problem list on file for this patient.      Past Surgical History  No past surgical history on file.    Social History  Social History     Socioeconomic History     Marital status: Single     Spouse name: Not on file     Number of children: Not on file     Years of education: Not on file     Highest education level: Not on file   Occupational History     Not on file   Social Needs     Financial resource strain: Not on file     Food insecurity:     Worry: Not on file     Inability: Not on file     Transportation needs:     Medical: Not on file     Non-medical: Not on file   Tobacco Use     Smoking status: Not on file   Substance and Sexual Activity     Alcohol use: Not on file     Drug use: Not on file     Sexual activity: Not on file   Lifestyle     Physical activity: " "    Days per week: Not on file     Minutes per session: Not on file     Stress: Not on file   Relationships     Social connections:     Talks on phone: Not on file     Gets together: Not on file     Attends [employer] service: Not on file     Active member of club or organization: Not on file     Attends meetings of clubs or organizations: Not on file     Relationship status: Not on file     Intimate partner violence:     Fear of current or ex partner: Not on file     Emotionally abused: Not on file     Physically abused: Not on file     Forced sexual activity: Not on file   Other Topics Concern     Not on file   Social History Narrative     Not on file         Allergies:  No Known Allergies    Problem List:    There are no active problems to display for this patient.       Past Medical History:    No past medical history on file.    Past Surgical History:    No past surgical history on file.    Family History:    No family history on file.    Social History:  Marital Status:  Single [1]  Social History     Tobacco Use     Smoking status: Not on file   Substance Use Topics     Alcohol use: Not on file     Drug use: Not on file        Medications:    hydrocodone-acetaminophen (NORCO) 5-325 MG per tablet  oxycodone-acetaminophen (PERCOCET) 5-325 MG per tablet          Review of Systems    All other systems are reviewed and are negative    Physical Exam   BP: (!) 153/116  Pulse: 116  Heart Rate: 117  Temp: 100  F (37.8  C)  Resp: (!) 42  Height: 167.6 cm (5' 6\")  Weight: 81.6 kg (180 lb)  SpO2: 96 %      Physical Exam    Nursing note and vitals were reviewed.  Constitutional: Awake and alert, adequately nourished and developed appearing 44-year-old who is tremulous, and tachycardic and appears to be in alcohol withdrawal.  HEENT: There is a large hematoma over the left thigh with a tiny laceration 2 mm in size oozing a bit of blood.  Conjunctiva is intact.  Lids are swollen.  Lashes are intact.  Anterior chamber is " clear.  Cornea is normal.  PERRLA.  EOMI. no diplopia on extreme gaze.  No malocclusion of the teeth.  No evidence of dental injury.  Neck: He is able to focus attention on his neck.  He has good range of motion of the neck in 6 directions with no discomfort.  He has no tenderness to palpation or percussion over the cervical spine.  Cardiovascular: Cardiac examination reveals tachycardic heart rate and regular rhythm without murmur.  Pulmonary/Chest: Breathing is unlabored.  Breath sounds are clear and equal bilaterally.  There no retractions, tachypnea, rales, wheezes, or rhonchi.  Abdomen: Soft, nontender, no HSM or masses rebound or guarding.  Musculoskeletal: Extremities are warm and well-perfused and without edema  Neurological: Alert, oriented, thought content logical, coherent.  Tremulous and irritable but able to focus his attention and answer questions appropriately.  Cranial nerve testing is normal.  Motor strength is intact in the upper and lower extremities.  Motor tone is normal.  Cerebellar testing reveals tremulousness but is otherwise normal.  No clonus is present.  Skin: Warm, dry, no rashes.  Psychiatric: Affect congruent with acute withdrawal.    ED Course        Procedures               Critical Care time:  was 30 minutes for this patient excluding procedures.               Results for orders placed or performed during the hospital encounter of 12/05/19 (from the past 24 hour(s))   CBC with platelets differential   Result Value Ref Range    WBC 4.6 4.0 - 11.0 10e9/L    RBC Count 3.82 (L) 4.4 - 5.9 10e12/L    Hemoglobin 13.1 (L) 13.3 - 17.7 g/dL    Hematocrit 36.6 (L) 40.0 - 53.0 %    MCV 96 78 - 100 fl    MCH 34.3 (H) 26.5 - 33.0 pg    MCHC 35.8 31.5 - 36.5 g/dL    RDW 12.9 10.0 - 15.0 %    Platelet Count 127 (L) 150 - 450 10e9/L    Diff Method Automated Method     % Neutrophils 75.1 %    % Lymphocytes 15.4 %    % Monocytes 8.0 %    % Eosinophils 0.7 %    % Basophils 0.4 %    % Immature  Granulocytes 0.4 %    Nucleated RBCs 0 0 /100    Absolute Neutrophil 3.5 1.6 - 8.3 10e9/L    Absolute Lymphocytes 0.7 (L) 0.8 - 5.3 10e9/L    Absolute Monocytes 0.4 0.0 - 1.3 10e9/L    Absolute Eosinophils 0.0 0.0 - 0.7 10e9/L    Absolute Basophils 0.0 0.0 - 0.2 10e9/L    Abs Immature Granulocytes 0.0 0 - 0.4 10e9/L    Absolute Nucleated RBC 0.0    Comprehensive metabolic panel   Result Value Ref Range    Sodium 122 (L) 133 - 144 mmol/L    Potassium 3.3 (L) 3.4 - 5.3 mmol/L    Chloride 79 (L) 94 - 109 mmol/L    Carbon Dioxide 31 20 - 32 mmol/L    Anion Gap 12 3 - 14 mmol/L    Glucose 99 70 - 99 mg/dL    Urea Nitrogen 17 7 - 30 mg/dL    Creatinine 0.96 0.66 - 1.25 mg/dL    GFR Estimate >90 >60 mL/min/[1.73_m2]    GFR Estimate If Black >90 >60 mL/min/[1.73_m2]    Calcium 8.8 8.5 - 10.1 mg/dL    Bilirubin Total 1.3 0.2 - 1.3 mg/dL    Albumin 3.9 3.4 - 5.0 g/dL    Protein Total 7.3 6.8 - 8.8 g/dL    Alkaline Phosphatase 126 40 - 150 U/L     (H) 0 - 70 U/L     (H) 0 - 45 U/L   Alcohol ethyl   Result Value Ref Range    Ethanol g/dL <0.01 <0.01 g/dL   UA reflex to Microscopic   Result Value Ref Range    Color Urine Yellow     Appearance Urine Clear     Glucose Urine Negative NEG^Negative mg/dL    Bilirubin Urine Negative NEG^Negative    Ketones Urine 20 (A) NEG^Negative mg/dL    Specific Gravity Urine 1.013 1.003 - 1.035    Blood Urine Small (A) NEG^Negative    pH Urine 6.0 5.0 - 7.0 pH    Protein Albumin Urine 30 (A) NEG^Negative mg/dL    Urobilinogen mg/dL 2.0 0.0 - 2.0 mg/dL    Nitrite Urine Negative NEG^Negative    Leukocyte Esterase Urine Negative NEG^Negative    Source Catheterized Urine     RBC Urine 1 0 - 2 /HPF    WBC Urine <1 0 - 5 /HPF    Hyaline Casts 1 0 - 2 /LPF   Drug abuse screen urine   Result Value Ref Range    Amphetamine Qual Urine Negative NEG^Negative    Barbiturates Qual Urine Negative NEG^Negative    Benzodiazepine Qual Urine Negative NEG^Negative    Cannabinoids Qual Urine Negative  NEG^Negative    Cocaine Qual Urine Negative NEG^Negative    Opiates Qualitative Urine Negative NEG^Negative    PCP Qual Urine Negative NEG^Negative   CT Head w/o Contrast    Narrative    CT SCAN OF THE HEAD WITHOUT CONTRAST   12/5/2019 7:37 PM     HISTORY: Seizure.    TECHNIQUE:  Axial images of the head and coronal reformations without  IV contrast material. Radiation dose for this scan was reduced using  automated exposure control, adjustment of the mA and/or kV according  to patient size, or iterative reconstruction technique.    COMPARISON: None.    FINDINGS:  The cerebral hemispheres, brainstem, and cerebellum  demonstrate normal morphology and signal. No evidence of acute  ischemia, hemorrhage, mass, mass effect, or hydrocephalus. The  calvarium, tympanic cavities, mastoid cavities are unremarkable. Left  frontal soft tissue contusion is present. Refer to maxillofacial  report for additional details.      Impression    IMPRESSION:  No acute intracranial abnormality.    NATHALY CHAVEZ MD   CT Maxillofacial w/o Contrast    Narrative    CT FACIAL BONES WITHOUT CONTRAST 12/5/2019 7:37 PM     HISTORY: Seizure. Facial trauma.    TECHNIQUE: Axial CT images of the facial bones were completed with  sagittal and coronal reformations. Radiation dose for this scan was  reduced using automated exposure control, adjustment of the mA and/or  kV according to patient size, or iterative reconstruction technique.     COMPARISON: None.    FINDINGS:   A minimally displaced nasal bone fracture is present with slight  inferior angulation of the anterior nasal bone. The nasal septum is  slightly deviated to the left without displaced fracture identified.  No other fractures are identified. The zygomatic arches, pterygoid  plates, and sphenotemporal buttresses are intact. Mandible and  temporomandibular joints are intact. A 1.6 cm cystic lesion is present  surrounding the roots of tooth numbers 15 and 16. There is  complete  opacification of the left maxillary sinus with thickening and  sclerosis of the left maxillary sinus wall suggestive of chronic  sinusitis. Extensive soft tissue contusion and swelling is present  involving the left greater than right forehead and left greater than  right preseptal soft tissues. The underlying orbits are unremarkable.  The globes, lenses, extraocular muscles, orbital fat, and orbital  vasculature are maintained. No evidence of intraorbital hemorrhage.  The visualized intracranial cavity is unremarkable. Refer to head  report for additional details.      Impression    IMPRESSION:   1. Minimally displaced nasal bone fracture.  2. No other fractures are identified.  3. Extensive frontal and periorbital preseptal soft tissue swelling.  4. No evidence of underlying globe rupture or intraorbital hemorrhage.  5. Complete soft tissue opacification of the left maxillary sinus with  wall thickening/sclerosis suggestive of chronic sinusitis. Underlying  mass lesion or mucocele cannot be excluded. Consider otolaryngology  consult.  6. Nonspecific 1.6 cm cystic lesion surrounding the roots of tooth  numbers 15 and 16. Differential diagnosis includes periapical cyst,  periapical abscess, periapical granuloma, keratocystic odontogenic  tumor, or less likely ameloblastoma.    NATHALY CHAVEZ MD       Medications   lactated ringers BOLUS 1,000 mL (1,000 mLs Intravenous Not Given 12/5/19 1836)     Followed by   lactated ringers BOLUS 1,000 mL ( Intravenous Rate/Dose Change 12/5/19 1957)     Followed by   lactated ringers infusion (has no administration in time range)   lidocaine (XYLOCAINE) 2 % external gel (has no administration in time range)   LORazepam (ATIVAN) injection 1 mg (1 mg Intravenous Given 12/5/19 1851)   0.9% sodium chloride BOLUS (0 mLs Intravenous Stopped 12/5/19 1942)   LORazepam (ATIVAN) injection 1 mg (1 mg Intravenous Given 12/5/19 1959)        1825 Patient assessed. Course of Care  Outlined.      Assessments & Plan (with Medical Decision Making)     44-year-old male presented having had a generalized tonic-clonic seizure and his chiropractor's office.  He has a history of opiate use disorder and is on Suboxone but alleges that he is not using any additional drugs.  He appeared to be in alcohol withdrawal.  His laboratory evaluation showed mild anemia, thrombocytopenia, transaminitis, hyponatremia, all suggestive of an alcohol use disorder.  He had reported episodes of nausea and vomiting yesterday and today.  In retrospect this was likely due to binge drinking with subsequent gastritis and/or hepatitis and subsequent alcohol withdrawal seizure.  He initially denied this.  When I discussed with him the laboratory findings and that this point in the direction of alcohol abuse his response was that he needed to quit.  His father had arrived in the room at that time and father said to the patient that he thought he had quit drinking the patient admitted that he had not.  Father asked the patient if this is the reason he is shaking all the time.  I said it was likely that he was having symptoms of withdrawal causing his shaking.  Hopefully the patient will gain some insight and be more truthful about his alcohol use.  He will require hospitalization for treatment of his withdrawal and monitoring because of his seizure and correction of his hyponatremia.  He is agreeable to the plan for admission.  I discussed his case with the hospitalists will assume care and admission.  We had initially administered a normal saline fluid bolus but when his sodium came back low we slowed the normal saline fluids down to 125 cc/h.  We also administered a milligram of lorazepam IV with slight improvement in his symptoms of withdrawal but not sufficient and we administered a second 1 mg dose of lorazepam prior to transfer to the [facility] for continuing treatment.  He otherwise remained stable during his  time in the emergency department.  He had sustained a contusion of the forehead and had a hematoma with a tiny laceration that did not require repair.  Because of his seizure and head injury he underwent CT scan of the brain and face.  Results as above with no evidence of a serious traumatic injury.  He has some incidental findings including a cystic lesion at the roots of teeth #15 and 16 but I made the inpatient service aware of but probably can have outpatient follow-up.  He has a nasal bone fracture which should not require any specific intervention.  If he is unsatisfied with the appearance of his nose after 10 to 14 days when the swelling has resolved or his difficulty breathing through his nose, he should have follow-up in ENT.  He should also have follow-up with ENT regarding the opacification of the maxillary sinus.  He does not have diplopia to suggest a blowout fracture and none was seen on CT but if he develops diplopia with extreme gaze would reconsider whether a blowout fracture may be present.    I have reviewed the nursing notes.    I have reviewed the findings, diagnosis, plan and need for follow up with the patient.       New Prescriptions    No medications on file       Final diagnoses:   Seizure (H)   Alcohol withdrawal syndrome with complication (H)   Anemia, unspecified type   Hyponatremia   Thrombocytopenia (H)   Transaminitis   Closed fracture of nasal bone, initial encounter   Cyst of mandible     This document serves as a record of the services and decisions personally performed and made by Adolfo Galvez MD. It was created on HIS/HER behalf by Stephen Gomez, a trained medical scribe. The creation of this document is based the provider's statements to the medical scribe.  Stephen Gomez 6:48 PM 12/5/2019    Provider:   The information in this document, created by the medical scribe for me, accurately reflects the services I personally performed and the decisions made by me. I have  reviewed and approved this document for accuracy prior to leaving the patient care area.  Adolfo Galvez MD 6:48 PM 12/5/2019 12/5/2019   [facility] EMERGENCY DEPARTMENT     Adolfo Galvez MD  12/05/19 2020

## 2019-12-06 NOTE — PHARMACY
Medication list updated with patient bedside. Patient is not a great historian. Medication list is updated best as possible with patients current memory state.    Patient was able to confirm Suboxone usage. Blood Pressure medications appeared be questionable at this time for him. Sunil is reporting them active. Patient requested I keep them.

## 2019-12-06 NOTE — ED NOTES
"Pt back from CT, remains a/o x 4, states \"my tongue is swelling\" offered ice chips to help with swelling, pt agreed, this was done.   "

## 2019-12-06 NOTE — PROGRESS NOTES
"[facility]    Medicine Progress Note - Hospitalist Service       Date of Admission:  12/5/2019  Assessment & Plan   Lester Winchester is a 44 year old male admitted on 12/5/2019. He is being admitted for evaluation and treatment after having a seizure, likely due to alcohol withdrawal.      Seizure, likely due to alcohol withdrawal  Denies any prior history of seizures. Witnessed seizure most likely due to alcohol withdrawal (see below), although also hyponatremic which may have contributed as well. Head CT negative for acute intracranial abnormalities. Has some facial/sinus abnormalities, see below. Was given Ativan 1 mg x 2 in the emergency department.   - Address withdrawal as below  - No antiepileptic medications indicated at this time  - Seizure precautions     Alcohol dependence with withdrawal  Reports drinking daily - 2 \"[employer]\" and 2-3 beers daily, last drink was evening on 12/3. Denies any prior hospitalizations for withdrawal, but admits to getting shaky when he does not drink.   - Remains tremulous on exam, tachycardic, hypertensive  - [facility] protocol  - IV vitamins, then oral    Hyponatremia  Admit sodium 122. Was given 1L NS bolus in the emergency department, sodium overcorrected to 131. Suspect hyponatremia multifactorial due to alcohol, thiazide use (although not a new medication), and possible poor intake from recent GI illness.  - Sodium check q 6 hours  - DDAVP and D5W bolus ordered (unable to give until around 3 am due to current Mg replacement), will recheck sodium at 6 am  - Goal sodium is < 130 by 6:30 pm on 12/6 to avoid rapid overcorrection  - Recheck Na not done, ordered stat, Stopped NS IVF    Left maxillary sinus opacification  Noted on facial CT, most consistent with chronic sinusitis. However, cannot rule out occult blow out fracture, so be watchful of development of double vision or other vision changes.  - Monitor for vision changes  - Consider ENT follow-up " "    Dental root cyst vs abscess or other abnormality  Incidental finding noted on CT face - \"Nonspecific 1.6 cm cystic lesion surrounding the roots of tooth numbers 15 and 16. Differential diagnosis includes periapical cyst, periapical abscess, periapical granuloma, keratocystic odontogenic tumor, or less likely ameloblastoma.\" Patient denies current dental pain, no obvious abnormality on exam, however he does mention occasional tooth pain. Afebrile, no leukocytosis on admission.   - No antibiotics indicated currently, but consider further evaluation and work-up if he becomes febrile or develops leukocytosis  - May need referral to OMFS    LFT elevation  Elevated lipase  Admit , . Lipase 1090. Most likely related to alcohol use. Patient is non-tender with abdominal palpation, no evidence of overt pancreatitis. Received 3L IV fluids in the emergency department.  - CMP and lipase in am   -  Alcohol cessation    Thrombocytopenia  Admit platelets 129. Likely suppressed due to chronic alcohol.  - Alcohol cessation  - follow    Normocytic anemia  Admit hemoglobin 13.1, MCV 96. No evidence of acute bleed (other than minor facial laceration bleeding).  - HGB decreased with Intravenous fluids, follow     Hypokalemia / hypomagnesemia  Admit K = 3.3, Mg 0.8.  - Replacement protocol    Recent GI illness  Had reported nausea, vomiting, and diarrhea on 12/4, emesis x 1 on 12/5. Now seems to be resolved. Patient did not drink alcohol on 12/4 due to GI illness.  - Monitor for recurrence  - Prn antiemetics available  - Good hand hygiene    Narcotic dependence  Is on daily Suboxone (2/0.5 mg) bid, continue.  Initial urine drug screen negative for opiates but Buprenorphine will not cause a positive result    Benign essential hypertension  Pressures slightly elevated. Managed prior to admission with lisinopril-hydrochlorothiazide 20/12.5 mg daily. hydrochlorothiazide may be contributing to hyponatremia (although this is " not a new medication).  - Held prior to admission hydrochlorothiazide  - Continued prior to admission lisinopril   - BPs are moderately elevated consistent with ETOH withdrawal    Anxiety disorder  Managed prior to admission with Prozac 20 mg q am. May possibly be contributing to hyponatremia, but is not a new medication.  - Continued prior to admission Prozac, but discontinue if hyponatremia is not improving    Cigarette nicotine dependence without complication  Nicotine patch available.       Diet: Advance Diet as Tolerated: Full Liquid Diet    DVT Prophylaxis: Low Risk/Ambulatory with no VTE prophylaxis indicated  Garrett Catheter: not present  Code Status: Full Code      Disposition Plan   Expected discharge: 2 - 3 days, recommended to prior living arrangement once withdrawal symptoms improving.  Entered: Jj Curtis MD 12/06/2019, 10:20 AM       The patient's care was discussed with the Bedside Nurse and Patient.    Jj Curtis MD  Hospitalist Service  [facility]    ______________________________________________________________________    Interval History   No complaints  Denies hallucinations    Data reviewed today: I reviewed all medications, new labs and imaging results over the last 24 hours. I personally reviewed no images or EKG's today.    Physical Exam   Vital Signs: Temp: 98.3  F (36.8  C) Temp src: Oral BP: (!) 145/103 Pulse: 80 Heart Rate: 98 Resp: 16 SpO2: 96 % O2 Device: None (Room air)    Weight: 179 lbs 7.27 oz  Constitutional: awake, alert, cooperative, tremulous  Respiratory: No increased work of breathing, good air exchange, clear to auscultation bilaterally, no crackles or wheezing  Cardiovascular: regular rate and rhythm  GI: soft and distended  Skin: no rashes, bilateral orbital ecchymoses     Data     CMP  Recent Labs   Lab 12/06/19  0549 12/06/19  0002 12/05/19  1839   NA  --  131* 122*   POTASSIUM  --   --  3.3*   CHLORIDE  --   --  79*   CO2  --   --  31    ANIONGAP  --   --  12   GLC  --   --  99   BUN  --   --  17   CR  --   --  0.96   GFRESTIMATED  --   --  >90   GFRESTBLACK  --   --  >90   DIONNE  --   --  8.8   MAG 2.1 0.8*  --    PROTTOTAL  --   --  7.3   ALBUMIN  --   --  3.9   BILITOTAL  --   --  1.3   ALKPHOS  --   --  126   AST  --   --  175*   ALT  --   --  147*     CBC  Recent Labs   Lab 12/06/19  0549 12/05/19  1839   WBC 4.6 4.6   RBC 3.45* 3.82*   HGB 11.9* 13.1*   HCT 33.5* 36.6*   MCV 97 96   MCH 34.5* 34.3*   MCHC 35.5 35.8   RDW 13.0 12.9   * 127*

## 2019-12-06 NOTE — PROGRESS NOTES
Patient was seen by provider and multiple orders started including magnesium replacement and potasium replacement, did give 1 mg po of Ativan for CIWA score of 7 earlier tonight.  Continue to monitor closely.

## 2019-12-06 NOTE — PROGRESS NOTES
"[facility] ADMISSION NOTE    Patient admitted to room 1010 at approximately 2200 via cart from emergency room. Patient was accompanied by transport tech.     Verbal SBAR report received from AMARJIT Still RN prior to patient arrival.     Patient trasferred to bed via air adithya. Patient alert and oriented X 3. Pain is controlled without any medications.  . Admission vital signs: Blood pressure 128/82, pulse 111, temperature 98.7  F (37.1  C), temperature source Oral, resp. rate 16, height 1.676 m (5' 6\"), weight 81.4 kg (179 lb 7.3 oz), SpO2 94 %. Patient was oriented to plan of care, call light, bed controls, tv, telephone, bathroom and visiting hours.     Risk Assessment    The following safety risks were identified during admission: fall. Yellow risk band applied: YES.     Skin Initial Assessment    This writer admitted this patient and completed a full skin assessment and Sacha score in the Adult PCS flowsheet. Appropriate interventions initiated as needed.     Secondary skin check completed by Alejandra,ICU CHG RN.    Sacha Risk Assessment  Sensory Perception: 3-->slightly limited  Moisture: 3-->occasionally moist  Activity: 3-->walks occasionally  Mobility: 3-->slightly limited  Nutrition: 3-->adequate  Friction and Shear: 3-->no apparent problem  Sacha Score: 18  Bed Support Surface: Atmos Air mattress  Sacha Intervention(s) Implemented: antiembolic/splint/device removed for skin assessment, draw sheets, encouraged fluids, heels suspended, HOB elevated 30 degrees or less, patient /family education on pressure injury prevention, patient education on pressure relief reinforced.  Ice pack applied to head laceration wound.  Does have reddened bilateral knees patient states from impact when hit the floor during seizure.      Education    Patient has a [place] to Observation order: No  Observation education completed and documented: N/A      Cindy Johanson, RN    "

## 2019-12-06 NOTE — DISCHARGE INSTRUCTIONS
Behavioral/Mental Health Resources  [place], [place], [facility], [facility], [place], [place], [facility], [place] and [facility]    **PATIENTS SHOULD CALL/CHECK WITH THEIR INSURANCE FIRST **    Mental Health Crisis Numbers:  [place] after hours Crisis Line      [phone]     Options For Deaf + Hard of Hearing    [phone]    Text [place] to 070284      24/7 Crisis Texting line    Trans Lifeline  (Fight the epidemic of trans suicide)  1-913.968.5770    https://www.translifeline.org/    The MARIBEL HelpLine can be reached Monday through Friday, 10 am-6 pm, ET.  2-838-006-MARIBEL (8494) or info@maribel.org    National Suicide Prevention LifeLine       Call the National Suicide Prevention Lifeline at 4-402-726-TALK (3704) to be connected to a counselor at a crisis center in your area if you, a family member, or friend are experiencing thoughts of suicide. The call is FREE, confidential, and always available.     *Fast-Tracker is an online mental health/chemical dependency resource site. Mental health providers, care coordinators and consumers can go to www.fast-trackermn.org  to search for community mental health providers and information with a real-time, searchable directory of mental health resources and their availability within [place]*    Crisis Mobile Services:  [facility]   [phone]  [facility]   [phone]  [facility]       [phone]  [facility]        SAME AS ABOVE  [facility]       SAME AS ABOVE  [facility]      SAME AS ABOVE  [facility]      SAME AS ABOVE  [place] and [place]:      [phone] or [phone]  Other Counties:    [place]-  Adults   [phone]   Children [phone]   [place]-  Adults  [phone]  Children [phone]     Pregnancy & Post-partum Support     Helpline [phone]  Farm & Rural Helpline NEW 3-2019    [phone]       Chemical Health Services:    Medicare & Chemical Health Assessments:  [facility]  [facility]   [address], [address]   [place], [place]   [phone]      [facility]    [address], [place]  [phone]  [facility]          [address]     [place], [place]  [phone]    Rule 25 and/or Chemical Health Assessment:  If a person has insurance, s/he must contact their insurance companies Behavioral Health division and follow recommendations for a chemical health assessment and then follow the recommendations of the .    If a person does NOT have insurance, they must get a Rule 25 (state funded chemical health assessment).  They can contact their County of Residence's Chemical Health Unit to discover who their county networks to conduct the assessment.    Chemical Dependency Assessment:   - [facility]: [phone]   - Behavioral Health Providers, can help find a provider near you:  [phone]      Counseling/psychotherapy:  - [facility] - [place],/[place]/ \[facility]\""/[place] /other locations: [phone]  - Behavioral Healthcare Providers- Can help find a provider near you:  [phone]  - [facility] Services-[place]:  [phone]  - [facility]- [place]:  [phone]  - [facility]- [place]:  [phone]  - [facility]-[place]: [phone]  - [facility]- [place]/[facility]/other locations:  [phone]  - [facility]- [facility]/[place]:  [phone]  - [facility] - [place]:  [phone]  - [facility] - [place]:  [phone]    [facility]- [place]-based in [place]:  [phone]  - [phone]  - [facility]- [place]: [phone]  - [facility]- [place] [phone]  - [facility] located in [place] (not affiliated with [place]):  [phone]  - [facility]- [place]:  [phone]/[place]: [phone] and other locations.  - [facility]- [place]: [phone]  - [facility]- [facility]:  [phone]  - [facility]- [place]: [phone]  - Walk in counseling center (Free counseling services)- [facility]: [phone]     Psychiatry/ Mental Health Medication management:  - [facility]- [place],/[place]/[facility] [place]/ [place]/ [facility] locations: [phone]  - Behavioral Healthcare Providers- Can help find a provider near you, if you have preferred one or [facility] they can identify who is in network and assist with scheduling an appointment:  [phone]  - [facility]: [place]/[facility] locations : [phone]  - [facility] - Collaborative model  with PCP involvement:  [phone] (requires PCP referral specifically)  - [facility]- [place]:  [phone]  - [facility]- [place]: [phone] [place]/[facility]:  [phone]/[place]:  [phone]/ [place]:  [phone]  - [facility]- [place]:   [phone]  - [facility]- [place], [phone]  - [facility] - Medical students who rotate yearly:  [phone]    County Numbers  - [place]: [phone]  - [facility]: [phone]  - [place]: [phone]  - [facility] : [phone]  - [facility]: [phone]  - [facility] County: [phone]  - [facility]: [phone]  - [facility]: [phone]  - [facility]: [phone]  - [facility]: [phone]    **Please note that this list does not include all agencies that provide services**    Care Transitions Team at [facility] [phone]

## 2019-12-06 NOTE — ED TRIAGE NOTES
franklinvirgen from pain clinic  Was there for mgmt of chronic back pain, on suboxone  Pt was sitting in a chair then found on the floor in tonic/clonic seizure 2 minutes duration  2.5 cm laceration left brow, bleeding controlled with pressure dressing  Postictal upon arrival    Oriented x4 on arrival  Breathing even and unnlabored  No neck pain  States he is a social drinker, last drank last weekend. Denies street drug use

## 2019-12-06 NOTE — PLAN OF CARE
No change through the night continues to have withdrawal symptoms of tremors and snoring respirations after Ativan dosing.  Ciwa scores of high single digits of 7, 8 and 9.  Received 2 mg of Ativan IV in ED and 2 mg PO here in ICU.  Ecchymosis to bilateral eye sockets are worse than when first admitted despite constant ice to area.  Small laceration to left eyebrow continues to ooze serous pink bloody to turban gauze dressing.  Laceration was not sealed or sutures as per ED Provider not needed.  Plan of care continues.

## 2019-12-06 NOTE — PROGRESS NOTES
Completed K+ replacement and medicated again with 1 mg of Ativan PO at this time for CIWA score of 8.  At present second part of protocal for Na reading dropping too fast given now the Dextrose 500 ml over 2 hours.  Resting quietly between cares.

## 2019-12-06 NOTE — PROGRESS NOTES
Resting since last Ativan, arousable but very sleepy, fresh ice to left eye socket echymosis and facial swelling.  Patient has been turning self from side to side, triggering the bed alarm when turning abruptly.  Seizure pads to side rail on right side, left rail down so able to reach urinal and place back on IV pole.  No signs  of seizures, Awaiting next lab draw for status of such.  Continue to monitor.

## 2019-12-07 LAB
ALBUMIN SERPL-MCNC: 3.3 G/DL (ref 3.4–5)
ALBUMIN UR-MCNC: NEGATIVE MG/DL
ALP SERPL-CCNC: 105 U/L (ref 40–150)
ALT SERPL W P-5'-P-CCNC: 92 U/L (ref 0–70)
ANION GAP SERPL CALCULATED.3IONS-SCNC: 8 MMOL/L (ref 3–14)
APPEARANCE UR: CLEAR
AST SERPL W P-5'-P-CCNC: 74 U/L (ref 0–45)
BILIRUB SERPL-MCNC: 1.1 MG/DL (ref 0.2–1.3)
BILIRUB UR QL STRIP: NEGATIVE
BUN SERPL-MCNC: 9 MG/DL (ref 7–30)
CALCIUM SERPL-MCNC: 8.8 MG/DL (ref 8.5–10.1)
CHLORIDE SERPL-SCNC: 88 MMOL/L (ref 94–109)
CO2 SERPL-SCNC: 32 MMOL/L (ref 20–32)
COLOR UR AUTO: YELLOW
CREAT SERPL-MCNC: 0.79 MG/DL (ref 0.66–1.25)
CREAT UR-MCNC: 76 MG/DL
ERYTHROCYTE [DISTWIDTH] IN BLOOD BY AUTOMATED COUNT: 12.3 % (ref 10–15)
FRACT EXCRET NA UR+SERPL-RTO: 0.6 %
GFR SERPL CREATININE-BSD FRML MDRD: >90 ML/MIN/{1.73_M2}
GLUCOSE SERPL-MCNC: 94 MG/DL (ref 70–99)
GLUCOSE UR STRIP-MCNC: NEGATIVE MG/DL
HCT VFR BLD AUTO: 33.2 % (ref 40–53)
HGB BLD-MCNC: 11.7 G/DL (ref 13.3–17.7)
HGB UR QL STRIP: NEGATIVE
KETONES UR STRIP-MCNC: NEGATIVE MG/DL
LEUKOCYTE ESTERASE UR QL STRIP: NEGATIVE
MCH RBC QN AUTO: 34.3 PG (ref 26.5–33)
MCHC RBC AUTO-ENTMCNC: 35.2 G/DL (ref 31.5–36.5)
MCV RBC AUTO: 97 FL (ref 78–100)
NITRATE UR QL: NEGATIVE
OSMOLALITY SERPL: 260 MMOL/KG (ref 275–295)
OSMOLALITY UR: 334 MMOL/KG (ref 100–1200)
PH UR STRIP: 9 PH (ref 5–7)
PLATELET # BLD AUTO: 122 10E9/L (ref 150–450)
POTASSIUM SERPL-SCNC: 3.6 MMOL/L (ref 3.4–5.3)
PROT SERPL-MCNC: 6.7 G/DL (ref 6.8–8.8)
RBC # BLD AUTO: 3.41 10E12/L (ref 4.4–5.9)
RBC #/AREA URNS AUTO: 0 /HPF (ref 0–2)
SODIUM SERPL-SCNC: 127 MMOL/L (ref 133–144)
SODIUM SERPL-SCNC: 128 MMOL/L (ref 133–144)
SODIUM SERPL-SCNC: 128 MMOL/L (ref 133–144)
SODIUM SERPL-SCNC: 129 MMOL/L (ref 133–144)
SODIUM UR-SCNC: 79 MMOL/L
SOURCE: ABNORMAL
SP GR UR STRIP: 1.01 (ref 1–1.03)
UROBILINOGEN UR STRIP-MCNC: 0 MG/DL (ref 0–2)
WBC # BLD AUTO: 4.6 10E9/L (ref 4–11)
WBC #/AREA URNS AUTO: <1 /HPF (ref 0–5)

## 2019-12-07 PROCEDURE — 84295 ASSAY OF SERUM SODIUM: CPT | Performed by: INTERNAL MEDICINE

## 2019-12-07 PROCEDURE — 84300 ASSAY OF URINE SODIUM: CPT | Performed by: INTERNAL MEDICINE

## 2019-12-07 PROCEDURE — 25000128 H RX IP 250 OP 636: Performed by: INTERNAL MEDICINE

## 2019-12-07 PROCEDURE — 81001 URINALYSIS AUTO W/SCOPE: CPT | Performed by: INTERNAL MEDICINE

## 2019-12-07 PROCEDURE — 20000003 ZZH R&B ICU

## 2019-12-07 PROCEDURE — 36415 COLL VENOUS BLD VENIPUNCTURE: CPT | Performed by: INTERNAL MEDICINE

## 2019-12-07 PROCEDURE — 82570 ASSAY OF URINE CREATININE: CPT | Performed by: INTERNAL MEDICINE

## 2019-12-07 PROCEDURE — 83935 ASSAY OF URINE OSMOLALITY: CPT | Performed by: INTERNAL MEDICINE

## 2019-12-07 PROCEDURE — 99232 SBSQ HOSP IP/OBS MODERATE 35: CPT | Performed by: INTERNAL MEDICINE

## 2019-12-07 PROCEDURE — 83930 ASSAY OF BLOOD OSMOLALITY: CPT | Performed by: INTERNAL MEDICINE

## 2019-12-07 PROCEDURE — 25000132 ZZH RX MED GY IP 250 OP 250 PS 637: Performed by: PHYSICIAN ASSISTANT

## 2019-12-07 PROCEDURE — 25000125 ZZHC RX 250: Performed by: INTERNAL MEDICINE

## 2019-12-07 PROCEDURE — 85027 COMPLETE CBC AUTOMATED: CPT | Performed by: INTERNAL MEDICINE

## 2019-12-07 PROCEDURE — 25000128 H RX IP 250 OP 636: Performed by: PHYSICIAN ASSISTANT

## 2019-12-07 PROCEDURE — 80053 COMPREHEN METABOLIC PANEL: CPT | Performed by: INTERNAL MEDICINE

## 2019-12-07 RX ORDER — LANOLIN ALCOHOL/MO/W.PET/CERES
100 CREAM (GRAM) TOPICAL DAILY
Status: DISCONTINUED | OUTPATIENT
Start: 2019-12-08 | End: 2019-12-08 | Stop reason: HOSPADM

## 2019-12-07 RX ORDER — FOLIC ACID 1 MG/1
1 TABLET ORAL DAILY
Status: DISCONTINUED | OUTPATIENT
Start: 2019-12-08 | End: 2019-12-08 | Stop reason: HOSPADM

## 2019-12-07 RX ADMIN — LISINOPRIL 20 MG: 20 TABLET ORAL at 08:06

## 2019-12-07 RX ADMIN — LORAZEPAM 2 MG: 2 INJECTION INTRAMUSCULAR; INTRAVENOUS at 09:01

## 2019-12-07 RX ADMIN — THIAMINE HYDROCHLORIDE 100 MG: 100 INJECTION, SOLUTION INTRAMUSCULAR; INTRAVENOUS at 09:01

## 2019-12-07 RX ADMIN — LORAZEPAM 1 MG: 1 TABLET ORAL at 10:12

## 2019-12-07 RX ADMIN — Medication 1 MG: at 22:37

## 2019-12-07 RX ADMIN — LORAZEPAM 1 MG: 1 TABLET ORAL at 15:45

## 2019-12-07 RX ADMIN — BUPRENORPHINE HYDROCHLORIDE AND NALOXONE HYDROCHLORIDE DIHYDRATE 1 TABLET: 2; .5 TABLET SUBLINGUAL at 19:56

## 2019-12-07 RX ADMIN — NICOTINE 1 PATCH: 7 PATCH TRANSDERMAL at 08:02

## 2019-12-07 RX ADMIN — LORAZEPAM 1 MG: 2 INJECTION INTRAMUSCULAR; INTRAVENOUS at 22:37

## 2019-12-07 RX ADMIN — NICOTINE POLACRILEX 2 MG: 2 GUM, CHEWING ORAL at 10:13

## 2019-12-07 RX ADMIN — NORTRIPTYLINE HYDROCHLORIDE 10 MG: 10 CAPSULE ORAL at 08:06

## 2019-12-07 RX ADMIN — BUPRENORPHINE HYDROCHLORIDE AND NALOXONE HYDROCHLORIDE DIHYDRATE 1 TABLET: 2; .5 TABLET SUBLINGUAL at 08:06

## 2019-12-07 RX ADMIN — FOLIC ACID 1 MG: 5 INJECTION, SOLUTION INTRAMUSCULAR; INTRAVENOUS; SUBCUTANEOUS at 09:01

## 2019-12-07 RX ADMIN — MULTIPLE VITAMINS W/ MINERALS TAB 1 TABLET: TAB at 08:06

## 2019-12-07 RX ADMIN — FLUOXETINE 20 MG: 20 CAPSULE ORAL at 08:06

## 2019-12-07 ASSESSMENT — ACTIVITIES OF DAILY LIVING (ADL)
ADLS_ACUITY_SCORE: 12
ADLS_ACUITY_SCORE: 11
ADLS_ACUITY_SCORE: 11
ADLS_ACUITY_SCORE: 13
ADLS_ACUITY_SCORE: 12
ADLS_ACUITY_SCORE: 11

## 2019-12-07 NOTE — PLAN OF CARE
VSS except patient remains hypertensive- MD was alerted to this earlier today so is aware.  Ongoing Na+ rechecks have been stable at 128.  Earlier this shift was scoring >15 per CIWA due to mainly tremor, anxiety, and restlessness so was medicated with lorazepam a few times, withdrawal S/S have improved throughout the day and patient has been more calm and accepting of being here- most recent score was 8.  Good appetite, intake and output adequate- is on fluid restriction of 1.5L now because of hyponatremia.  Has been A&O, walked in halls with this RN and demonstrated he is steady so has been up ad therese in room since this afternoon.  Hoping to discharge tomorrow.

## 2019-12-07 NOTE — PROGRESS NOTES
Sodium recheck = 128. No further intervention at this time, continue with q6h sodium checks.     TANNER CarverC

## 2019-12-07 NOTE — PROGRESS NOTES
Patient is anxious to leave today.  CIWA's have been 4-6 overnight and no ativan has been given.  He moves quickly and sporadically at times, but is alert and oriented.

## 2019-12-07 NOTE — PLAN OF CARE
Day uneventful. Pt Ox4. CIWA ~7 for tremors, agitation, anxiety, and sweaty. No ativan given this shift. No seizure activity. Fluids stopped this morning. D5 boluses given and as well as DDAVP for sodium goals. Tolerating diet. No BM. Voiding in urinal. Refused to get OOB. Anxious to leave.

## 2019-12-07 NOTE — PROGRESS NOTES
"[facility]    Medicine Progress Note - Hospitalist Service       Date of Admission:  12/5/2019  Assessment & Plan   Lester Winchester is a 44 year old male admitted on 12/5/2019. He is being admitted for evaluation and treatment after having a seizure, likely due to alcohol withdrawal.      Seizure, likely due to alcohol withdrawal  Denies any prior history of seizures. Witnessed seizure most likely due to alcohol withdrawal (see below), although also hyponatremic which may have contributed as well. Head CT negative for acute intracranial abnormalities. Has some facial/sinus abnormalities, see below. Was given Ativan 1 mg x 2 in the emergency department.   - Address withdrawal as below  - No antiepileptic medications indicated at this time  - Seizure precautions     Alcohol dependence with withdrawal  Reports drinking daily - 2 \"[employer]\" and 2-3 beers daily, last drink was evening on 12/3. Denies any prior hospitalizations for withdrawal, but admits to getting shaky when he does not drink.   - Anxious, tachycardic, hypertensive, but less tremulous  - WA protocol  - Vitamins, thiamine, folate    Hyponatremia  Admit sodium 122. Was given 1L NS bolus in the emergency department, sodium overcorrected to 131. Suspect hyponatremia multifactorial due to alcohol, thiazide use (although not a new medication), and possible poor intake from recent GI illness.  - Sodium check q 6 hours  - DDAVP and D5W bolus ordered (unable to give until around 3 am due to current Mg replacement), will recheck sodium at 6 am  - Goal sodium is < 130 by 6:30 pm on 12/6 to avoid rapid overcorrection  - Na stabilized at 128, may be SIADH due to anxiety. Reports he usually drinks 4788-9896 of water a day  - Appears euvolemic, check FeNa, urine osms, serum osms    Left maxillary sinus opacification  Noted on facial CT, most consistent with chronic sinusitis. However, cannot rule out occult blow out fracture, so be watchful " "of development of double vision or other vision changes.  - Monitor for vision changes  - Consider ENT follow-up     Dental root cyst vs abscess or other abnormality  Incidental finding noted on CT face - \"Nonspecific 1.6 cm cystic lesion surrounding the roots of tooth numbers 15 and 16. Differential diagnosis includes periapical cyst, periapical abscess, periapical granuloma, keratocystic odontogenic tumor, or less likely ameloblastoma.\" Patient denies current dental pain, no obvious abnormality on exam, however he does mention occasional tooth pain. Afebrile, no leukocytosis on admission.   - No antibiotics indicated currently, but consider further evaluation and work-up if he becomes febrile or develops leukocytosis  - May need referral to OMFS    LFT elevation  Elevated lipase  Admit , . Lipase 1090. Most likely related to alcohol use. Patient is non-tender with abdominal palpation, no evidence of overt pancreatitis. Received 3L IV fluids in the emergency department.  -  Follow, improving  -  Alcohol cessation    Thrombocytopenia  Admit platelets 129. Likely suppressed due to chronic alcohol.  - Alcohol cessation  - follow, stable    Normocytic anemia  Admit hemoglobin 13.1, MCV 96. No evidence of acute bleed (other than minor facial laceration bleeding).  - HGB decreased with Intravenous fluids  - follow , stable    Recent GI illness  Had reported nausea, vomiting, and diarrhea on 12/4, emesis x 1 on 12/5. Now seems to be resolved. Patient did not drink alcohol on 12/4 due to GI illness.  - Monitor for recurrence  - Prn antiemetics available  - Good hand hygiene    Narcotic dependence  Is on daily Suboxone (2/0.5 mg) bid, continue.  Initial urine drug screen negative for opiates but Buprenorphine will not cause a positive result    Benign essential hypertension  Pressures slightly elevated. Managed prior to admission with lisinopril-hydrochlorothiazide 20/12.5 mg daily. hydrochlorothiazide may be " contributing to hyponatremia (although this is not a new medication).  - Held prior to admission hydrochlorothiazide  - Continued prior to admission lisinopril   - BPs are moderately elevated consistent with ETOH withdrawal    Anxiety disorder  Managed prior to admission with Prozac 20 mg q am. May possibly be contributing to hyponatremia, but is not a new medication.  - Continued prior to admission Prozac, but discontinue if hyponatremia is not improving    Cigarette nicotine dependence without complication  Nicotine patch available.     Diet: Advance Diet as Tolerated: Regular Diet Adult    DVT Prophylaxis: Low Risk/Ambulatory with no VTE prophylaxis indicated  Garrett Catheter: not present  Code Status: Full Code      Disposition Plan   Expected discharge: 1-2, recommended to prior living arrangement once stable, tremulousness improved.  Entered: Jj Curtis MD 12/07/2019, 1:29 PM       The patient's care was discussed with the Bedside Nurse and Patient.    Jj Curtis MD  Hospitalist Service  [facility]    ______________________________________________________________________    Interval History   Denies headache/diplopia  Feels anxious, better after receiving ativan this morning  Plans to quit using etoh    Data reviewed today: I reviewed all medications, new labs and imaging results over the last 24 hours. I personally reviewed no images or EKG's today.    Physical Exam   Vital Signs: Temp: 98.3  F (36.8  C) Temp src: Axillary BP: (!) 149/100 Pulse: 95 Heart Rate: 105 Resp: 18 SpO2: 98 % O2 Device: None (Room air)    Weight: 179 lbs 7.27 oz  Constitutional: awake, alert, cooperative, no apparent distress, and appears stated age  Neurologic: No tremor    Data       Lipase Results:  Lab Results   Component Value Date    LIPASE 916 (H) 12/06/2019    LIPASE 1,090 (H) 12/05/2019     CMP  Recent Labs   Lab 12/07/19  1215 12/07/19  0554 12/07/19  0036 12/06/19  2005  12/06/19  1003  12/06/19  0549 12/06/19  0002 12/05/19 1839   * 128* 127* 128*   < > 131*  --  131* 122*   POTASSIUM  --  3.6  --   --   --  3.8  --   --  3.3*   CHLORIDE  --  88*  --   --   --  91*  --   --  79*   CO2  --  32  --   --   --  32  --   --  31   ANIONGAP  --  8  --   --   --  8  --   --  12   GLC  --  94  --   --   --  82  --   --  99   BUN  --  9  --   --   --  13  --   --  17   CR  --  0.79  --   --   --  0.87  --   --  0.96   GFRESTIMATED  --  >90  --   --   --  >90  --   --  >90   GFRESTBLACK  --  >90  --   --   --  >90  --   --  >90   DIONNE  --  8.8  --   --   --  8.7  --   --  8.8   MAG  --   --   --   --   --   --  2.1 0.8*  --    PROTTOTAL  --  6.7*  --   --   --   --   --   --  7.3   ALBUMIN  --  3.3*  --   --   --   --   --   --  3.9   BILITOTAL  --  1.1  --   --   --   --   --   --  1.3   ALKPHOS  --  105  --   --   --   --   --   --  126   AST  --  74*  --   --   --   --   --   --  175*   ALT  --  92*  --   --   --   --   --   --  147*    < > = values in this interval not displayed.     CBC  Recent Labs   Lab 12/07/19  0554 12/06/19  0549 12/05/19 1839   WBC 4.6 4.6 4.6   RBC 3.41* 3.45* 3.82*   HGB 11.7* 11.9* 13.1*   HCT 33.2* 33.5* 36.6*   MCV 97 97 96   MCH 34.3* 34.5* 34.3*   MCHC 35.2 35.5 35.8   RDW 12.3 13.0 12.9   * 102* 127*

## 2019-12-07 NOTE — PROGRESS NOTES
Patient highly anxious, restless, wanting to discharge today.  Apparently lost his IV access through the night , didn't want it replaced but had some IV medications due this morning.  BP also quite elevated at 170s/100s this morning.  Spoke with Dr. Curtis about these concerns- clearly patient is exhibiting withdrawal S/S and had a seizure recently so IV access restored and 2 mg lorazepam given for CIWA score of 17.  Continuous pulse oximetry applied- will monitor closely.

## 2019-12-08 VITALS
DIASTOLIC BLOOD PRESSURE: 100 MMHG | TEMPERATURE: 98.6 F | RESPIRATION RATE: 16 BRPM | BODY MASS INDEX: 28.84 KG/M2 | SYSTOLIC BLOOD PRESSURE: 140 MMHG | WEIGHT: 179.45 LBS | HEART RATE: 98 BPM | HEIGHT: 66 IN | OXYGEN SATURATION: 97 %

## 2019-12-08 LAB
ALBUMIN SERPL-MCNC: 3.4 G/DL (ref 3.4–5)
ALP SERPL-CCNC: 92 U/L (ref 40–150)
ALT SERPL W P-5'-P-CCNC: 81 U/L (ref 0–70)
ANION GAP SERPL CALCULATED.3IONS-SCNC: 6 MMOL/L (ref 3–14)
AST SERPL W P-5'-P-CCNC: 55 U/L (ref 0–45)
BILIRUB SERPL-MCNC: 0.7 MG/DL (ref 0.2–1.3)
BUN SERPL-MCNC: 11 MG/DL (ref 7–30)
CALCIUM SERPL-MCNC: 10.2 MG/DL (ref 8.5–10.1)
CHLORIDE SERPL-SCNC: 96 MMOL/L (ref 94–109)
CO2 SERPL-SCNC: 32 MMOL/L (ref 20–32)
CREAT SERPL-MCNC: 0.9 MG/DL (ref 0.66–1.25)
ERYTHROCYTE [DISTWIDTH] IN BLOOD BY AUTOMATED COUNT: 12.8 % (ref 10–15)
GFR SERPL CREATININE-BSD FRML MDRD: >90 ML/MIN/{1.73_M2}
GLUCOSE SERPL-MCNC: 96 MG/DL (ref 70–99)
HCT VFR BLD AUTO: 32.8 % (ref 40–53)
HGB BLD-MCNC: 11.3 G/DL (ref 13.3–17.7)
MCH RBC QN AUTO: 34.5 PG (ref 26.5–33)
MCHC RBC AUTO-ENTMCNC: 34.5 G/DL (ref 31.5–36.5)
MCV RBC AUTO: 100 FL (ref 78–100)
PLATELET # BLD AUTO: 134 10E9/L (ref 150–450)
POTASSIUM SERPL-SCNC: 4.2 MMOL/L (ref 3.4–5.3)
PROT SERPL-MCNC: 6.7 G/DL (ref 6.8–8.8)
RBC # BLD AUTO: 3.28 10E12/L (ref 4.4–5.9)
SODIUM SERPL-SCNC: 132 MMOL/L (ref 133–144)
SODIUM SERPL-SCNC: 134 MMOL/L (ref 133–144)
WBC # BLD AUTO: 3.9 10E9/L (ref 4–11)

## 2019-12-08 PROCEDURE — 85027 COMPLETE CBC AUTOMATED: CPT | Performed by: INTERNAL MEDICINE

## 2019-12-08 PROCEDURE — 80053 COMPREHEN METABOLIC PANEL: CPT | Performed by: INTERNAL MEDICINE

## 2019-12-08 PROCEDURE — 36415 COLL VENOUS BLD VENIPUNCTURE: CPT | Performed by: INTERNAL MEDICINE

## 2019-12-08 PROCEDURE — 25000128 H RX IP 250 OP 636: Performed by: PHYSICIAN ASSISTANT

## 2019-12-08 PROCEDURE — 99239 HOSP IP/OBS DSCHRG MGMT >30: CPT | Performed by: INTERNAL MEDICINE

## 2019-12-08 PROCEDURE — 25000132 ZZH RX MED GY IP 250 OP 250 PS 637: Performed by: INTERNAL MEDICINE

## 2019-12-08 PROCEDURE — 25000132 ZZH RX MED GY IP 250 OP 250 PS 637: Performed by: PHYSICIAN ASSISTANT

## 2019-12-08 PROCEDURE — 84295 ASSAY OF SERUM SODIUM: CPT | Performed by: INTERNAL MEDICINE

## 2019-12-08 RX ORDER — MULTIPLE VITAMINS W/ MINERALS TAB 9MG-400MCG
1 TAB ORAL DAILY
Qty: 30 TABLET | Refills: 0 | Status: SHIPPED | OUTPATIENT
Start: 2019-12-09

## 2019-12-08 RX ORDER — LANOLIN ALCOHOL/MO/W.PET/CERES
100 CREAM (GRAM) TOPICAL DAILY
Qty: 30 TABLET | Refills: 0 | Status: SHIPPED | OUTPATIENT
Start: 2019-12-09

## 2019-12-08 RX ORDER — FOLIC ACID 1 MG/1
1 TABLET ORAL DAILY
Qty: 30 TABLET | Refills: 0 | Status: SHIPPED | OUTPATIENT
Start: 2019-12-09

## 2019-12-08 RX ADMIN — LISINOPRIL 20 MG: 20 TABLET ORAL at 08:40

## 2019-12-08 RX ADMIN — FOLIC ACID 1 MG: 1 TABLET ORAL at 08:40

## 2019-12-08 RX ADMIN — QUETIAPINE FUMARATE 25 MG: 25 TABLET ORAL at 00:37

## 2019-12-08 RX ADMIN — MULTIPLE VITAMINS W/ MINERALS TAB 1 TABLET: TAB at 08:40

## 2019-12-08 RX ADMIN — BUPRENORPHINE HYDROCHLORIDE AND NALOXONE HYDROCHLORIDE DIHYDRATE 1 TABLET: 2; .5 TABLET SUBLINGUAL at 08:39

## 2019-12-08 RX ADMIN — LORAZEPAM 1 MG: 2 INJECTION INTRAMUSCULAR; INTRAVENOUS at 08:57

## 2019-12-08 RX ADMIN — FLUOXETINE 20 MG: 20 CAPSULE ORAL at 08:40

## 2019-12-08 RX ADMIN — Medication 100 MG: at 08:40

## 2019-12-08 RX ADMIN — NICOTINE 1 PATCH: 7 PATCH TRANSDERMAL at 08:41

## 2019-12-08 RX ADMIN — NORTRIPTYLINE HYDROCHLORIDE 10 MG: 10 CAPSULE ORAL at 08:41

## 2019-12-08 ASSESSMENT — ACTIVITIES OF DAILY LIVING (ADL)
ADLS_ACUITY_SCORE: 12
ADLS_ACUITY_SCORE: 11
ADLS_ACUITY_SCORE: 12
ADLS_ACUITY_SCORE: 12

## 2019-12-08 NOTE — PLAN OF CARE
No seizure activity noted. Pt has tremors with arms extended. Is shaky when handling any items. Both improved after ativan 1 mg given IV but comntinues to have very mild tremors. B/P elevated [id number] with HR 97 and decreased after lisinopril po and IV ativan to 140/100 with HR 99  and is not agitated.  Dr. Curtis updated to all of the above and explained to pt about drinking liquids too much per day causing low sodium levels and encouraged pt to see PMD for Na level check and elevated B/P's and ETOH abuse. Given handout for places to help with addictions and crisis situations. Explained risk of Heart attack/CVA with low sodium  and HTN respectively and future seizures with ETOH.

## 2019-12-08 NOTE — DISCHARGE SUMMARY
"Discharge Summary    Lester Winchester MRN# [id number]   YOB: 1975 Age: 44 year old     Date of Admission:  12/5/2019  Date of Discharge:  12/8/2019  Admitting Physician:  Luis Felipe Wagoner MD  Discharge Physician:  Jj Curtis MD  Discharging Service:  Hospitalist     Primary Provider: No Ref-Primary, Physician              Discharge Diagnosis:     Principal Problem:    Seizure (H)  Active Problems:    Chronic pain disorder    Alcohol withdrawal (H)    Narcotic dependence (H)    LFT elevation    Benign essential hypertension    Anxiety disorder    Hyponatremia    Thrombocytopenia (H)    Normocytic anemia    Tobacco use disorder    Alcohol abuse    Left maxillary sinus opacification    Dental root cyst    Chronic back pain               Discharge Disposition:     Discharged to home           Condition on Discharge:     Discharge condition:   Fair   Discharge vitals: Blood pressure (!) 140/100, pulse 98, temperature 98.6  F (37  C), temperature source Oral, resp. rate 16, height 1.676 m (5' 6\"), weight 81.4 kg (179 lb 7.3 oz), SpO2 97 %.     Code status on discharge: Full Code           Procedures / Labs / Imaging:     Results for orders placed or performed during the hospital encounter of 12/05/19   CT Head w/o Contrast    Narrative    CT SCAN OF THE HEAD WITHOUT CONTRAST   12/5/2019 7:37 PM     HISTORY: Seizure.    TECHNIQUE:  Axial images of the head and coronal reformations without  IV contrast material. Radiation dose for this scan was reduced using  automated exposure control, adjustment of the mA and/or kV according  to patient size, or iterative reconstruction technique.    COMPARISON: None.    FINDINGS:  The cerebral hemispheres, brainstem, and cerebellum  demonstrate normal morphology and signal. No evidence of acute  ischemia, hemorrhage, mass, mass effect, or hydrocephalus. The  calvarium, tympanic cavities, mastoid cavities are unremarkable. Left  frontal soft tissue contusion is present. " Refer to maxillofacial  report for additional details.      Impression    IMPRESSION:  No acute intracranial abnormality.    NATHALY CHAVEZ MD       CT Maxillofacial w/o Contrast    Narrative    CT FACIAL BONES WITHOUT CONTRAST 12/5/2019 7:37 PM     HISTORY: Seizure. Facial trauma.    TECHNIQUE: Axial CT images of the facial bones were completed with  sagittal and coronal reformations. Radiation dose for this scan was  reduced using automated exposure control, adjustment of the mA and/or  kV according to patient size, or iterative reconstruction technique.     COMPARISON: None.    FINDINGS:   A minimally displaced nasal bone fracture is present with slight  inferior angulation of the anterior nasal bone. The nasal septum is  slightly deviated to the left without displaced fracture identified.  No other fractures are identified. The zygomatic arches, pterygoid  plates, and sphenotemporal buttresses are intact. Mandible and  temporomandibular joints are intact. A 1.6 cm cystic lesion is present  surrounding the roots of tooth numbers 15 and 16. There is complete  opacification of the left maxillary sinus with thickening and  sclerosis of the left maxillary sinus wall suggestive of chronic  sinusitis. Extensive soft tissue contusion and swelling is present  involving the left greater than right forehead and left greater than  right preseptal soft tissues. The underlying orbits are unremarkable.  The globes, lenses, extraocular muscles, orbital fat, and orbital  vasculature are maintained. No evidence of intraorbital hemorrhage.  The visualized intracranial cavity is unremarkable. Refer to head  report for additional details.      Impression    IMPRESSION:   1. Minimally displaced nasal bone fracture.  2. No other fractures are identified.  3. Extensive frontal and periorbital preseptal soft tissue swelling.  4. No evidence of underlying globe rupture or intraorbital hemorrhage.  5. Complete soft tissue opacification  "of the left maxillary sinus with  wall thickening/sclerosis suggestive of chronic sinusitis. Underlying  mass lesion or mucocele cannot be excluded. Consider otolaryngology  consult.  6. Nonspecific 1.6 cm cystic lesion surrounding the roots of tooth  numbers 15 and 16. Differential diagnosis includes periapical cyst,  periapical abscess, periapical granuloma, keratocystic odontogenic  tumor, or less likely ameloblastoma.    NATHALY CHAVEZ MD           Discharge Medications:     Current Discharge Medication List      START taking these medications    Details   folic acid (FOLVITE) 1 MG tablet Take 1 tablet (1 mg) by mouth daily  Qty: 30 tablet, Refills: 0    Associated Diagnoses: Alcohol abuse      multivitamin w/minerals (THERA-VIT-M) tablet Take 1 tablet by mouth daily  Qty: 30 tablet, Refills: 0    Associated Diagnoses: Alcohol abuse      vitamin B1 (THIAMINE) 100 MG tablet Take 1 tablet (100 mg) by mouth daily  Qty: 30 tablet, Refills: 0    Associated Diagnoses: Alcohol abuse         CONTINUE these medications which have NOT CHANGED    Details   buprenorphine-naloxone (SUBOXONE) 8-2 MG SUBL sublingual tablet Place 1 tablet under the tongue 2 times daily     Comments: NADEAN:      FLUoxetine (PROZAC) 20 MG capsule Take 20 mg by mouth daily      lisinopril-hydrochlorothiazide (PRINZIDE/ZESTORETIC) 20-12.5 MG tablet Take 1 tablet by mouth daily      nortriptyline (PAMELOR) 10 MG capsule Take 10 mg by mouth daily      Vitamin D, Cholecalciferol, 25 MCG (1000 UT) TABS Take 100 Units by mouth daily                   Consultations:     No consultations were requested during this admission             Brief History of Illness:     Lester Winchester is a 44 year old male who presented to the emergency department after having a witnessed seizure while in the waiting room at his [facility] clinic.     Patient was sitting in a chair and \"the next thing I know, I was on the floor and blood was everywhere.\" EMS had been called and he " "was brought to the emergency department. He was told by witnesses that he had a seizure and had hit his head as well. He thinks he bit his tongue, denies any urinary or fecal incontinence.     He denies any known prior seizures.      He is tremulous on exam. He admits to daily drinking, usually \"2 Crown-7's per day and 2-3 beers per day.\" He had a GI illness the day prior to admission, as well as one episode of emesis on the morning of admission, so his last drink was three days ago. He denies any prior hospitalizations for withdrawal but does get shaky if he hasn't had alcohol in a while.     He has some left facial pain and a headache after hitting his head. He has bilateral eye swelling. He denies current dental pain, but occasionally does get pain in his molars of the left upper jaw.      He denies any associated abdominal pain, melena, hematochezia, or hematemesis associated with his recent GI illness. He has had a decreased appetite since his illness. The occasionally gets palpitations and a racing heart, which he noticed today in the emergency department. He denies dysuria but reports occasional difficulty initiating a urinary stream. The remainder review of systems is negative.             Hospital Course:     Lester Winchester is a 44 year old male admitted on 12/5/2019. He is being admitted for evaluation and treatment after having a seizure, likely due to alcohol withdrawal.        Seizure, likely due to alcohol withdrawal  Denies any prior history of seizures. Witnessed seizure most likely due to alcohol withdrawal (see below), although also hyponatremic which may have contributed as well. Head CT negative for acute intracranial abnormalities. Has some facial/sinus abnormalities, see below. Was given Ativan 1 mg x 2 in the emergency department.   - Address withdrawal as below  - No antiepileptic medications indicated at this time  - Seizure precautions      Alcohol dependence with withdrawal  Reports drinking " "daily - 2 \"Crown & 7's\" and 2-3 beers daily, last drink was evening on 12/3. Denies any prior hospitalizations for withdrawal, but admits to getting shaky when he does not drink. Treated with ativan per [facility] protocol.   - Remained anxious, hypertensive, but less tremulous  - Vitamins, thiamine, folate     Hyponatremia  Admit sodium 122. Was given 1L NS bolus in the emergency department, sodium overcorrected to 131. Suspect hyponatremia multifactorial due to alcohol, thiazide use (although not a new medication), and possible poor intake from recent GI illness.  - DDAVP and D5W bolus ordered and given twice  - Na stabilized at 128, may be SIADH due to anxiety. Reports he usually drinks 0878-8438 of water a day. Appears euvolemic, check FeNa, urine osms, serum osms. Urine Na consistent with SIADH.   - Recommended fluid restriction to 1500cc.  Reassess at follow-up      Left maxillary sinus opacification  Noted on facial CT, most consistent with chronic sinusitis. However, cannot rule out occult blow out fracture, so be watchful of development of double vision or other vision changes.     Dental root cyst vs abscess or other abnormality  Incidental finding noted on CT face - \"Nonspecific 1.6 cm cystic lesion surrounding the roots of tooth numbers 15 and 16. Differential diagnosis includes periapical cyst, periapical abscess, periapical granuloma, keratocystic odontogenic tumor, or less likely ameloblastoma.\" Patient denies current dental pain, no obvious abnormality on exam, however he does mention occasional tooth pain. Afebrile, no leukocytosis on admission.   - No antibiotics indicated currently, but consider further evaluation and work-up if he becomes febrile or develops leukocytosis  - Recommend follow-up with regular dentist in <4 weeks     LFT elevation  Elevated lipase  Admit , . Lipase 1090. Most likely related to alcohol use. Patient is non-tender with abdominal palpation, no evidence of overt " pancreatitis. Received 3L IV fluids in the emergency department.  -  Alcohol cessation  - recheck at follow-up      Thrombocytopenia  Admit platelets 129. Likely suppressed due to chronic alcohol.  - follow, stable while here     Normocytic anemia  Admit hemoglobin 13.1, MCV 96. No evidence of acute bleed (other than minor facial laceration bleeding).  - HGB decreased with Intravenous fluids, stable at 11.3     Recent GI illness  Had reported nausea, vomiting, and diarrhea on 12/4, emesis x 1 on 12/5. Now  resolved. Patient did not drink alcohol on 12/4 due to GI illness.    Narcotic dependence  Is on daily Suboxone (2/0.5 mg) bid, continue.  Initial urine drug screen negative for opiates but Buprenorphine will not cause a positive result     Benign essential hypertension  Pressures slightly elevated. Managed prior to admission with lisinopril-hydrochlorothiazide 20/12.5 mg daily. hydrochlorothiazide may be contributing to hyponatremia (although this is not a new medication).  - Held prior to admission hydrochlorothiazide  - Continued prior to admission lisinopril   - BPs moderately elevated consistent with ETOH withdrawal or poorly controlled hypertension  - F/u with PCP     Anxiety disorder  Managed prior to admission with Prozac 20 mg q am. May possibly be contributing to hyponatremia, but is not a new medication.  - Continued prior to admission Prozac      Cigarette nicotine dependence without complication  Nicotine patch available while here.            Final Day of Progress before Discharge:       Assessment and Plan:  Stable, discharge.           Interval History:  Elevated BP and anxiety may be baseline. He is very anxious to go, father his here to bring him home. He is mentating clearly.           Physical Exam:  Vitals were reviewed  -  Temp: 98.6  F (37  C) Temp src: Oral BP: (!) 140/100 Pulse: 98   Resp: 16 SpO2: 97 % O2 Device: None (Room air)    Constitutional:   awake, alert, cooperative, no apparent  distress, and appears stated age  Fine tremor with hands extended          Data:    Lipase Results:  Lab Results   Component Value Date    LIPASE 916 (H) 12/06/2019    LIPASE 1,090 (H) 12/05/2019       CMP  Recent Labs   Lab 12/08/19  0714 12/08/19  0102 12/07/19  1741 12/07/19  1215 12/07/19  0554  12/06/19  1003 12/06/19  0549 12/06/19  0002 12/05/19  1839    132* 129* 128* 128*   < > 131*  --  131* 122*   POTASSIUM 4.2  --   --   --  3.6  --  3.8  --   --  3.3*   CHLORIDE 96  --   --   --  88*  --  91*  --   --  79*   CO2 32  --   --   --  32  --  32  --   --  31   ANIONGAP 6  --   --   --  8  --  8  --   --  12   GLC 96  --   --   --  94  --  82  --   --  99   BUN 11  --   --   --  9  --  13  --   --  17   CR 0.90  --   --   --  0.79  --  0.87  --   --  0.96   GFRESTIMATED >90  --   --   --  >90  --  >90  --   --  >90   GFRESTBLACK >90  --   --   --  >90  --  >90  --   --  >90   DIONNE 10.2*  --   --   --  8.8  --  8.7  --   --  8.8   MAG  --   --   --   --   --   --   --  2.1 0.8*  --    PROTTOTAL 6.7*  --   --   --  6.7*  --   --   --   --  7.3   ALBUMIN 3.4  --   --   --  3.3*  --   --   --   --  3.9   BILITOTAL 0.7  --   --   --  1.1  --   --   --   --  1.3   ALKPHOS 92  --   --   --  105  --   --   --   --  126   AST 55*  --   --   --  74*  --   --   --   --  175*   ALT 81*  --   --   --  92*  --   --   --   --  147*    < > = values in this interval not displayed.     CBC  Recent Labs   Lab 12/08/19  0714 12/07/19  0554 12/06/19  0549 12/05/19  1839   WBC 3.9* 4.6 4.6 4.6   RBC 3.28* 3.41* 3.45* 3.82*   HGB 11.3* 11.7* 11.9* 13.1*   HCT 32.8* 33.2* 33.5* 36.6*    97 97 96   MCH 34.5* 34.3* 34.5* 34.3*   MCHC 34.5 35.2 35.5 35.8   RDW 12.8 12.3 13.0 12.9   * 122* 102* 127*               Pending Results:     Unresulted Labs Ordered in the Past 30 Days of this Admission     No orders found from 11/5/2019 to 12/6/2019.                 Discharge Instructions and Follow-Up:     Discharge diet:    Regular   Discharge activity: Activity as tolerated   Discharge follow-up: Follow up with primary care provider, Physician No Ref-Primary, within 7 days for hospital follow- up.  The following labs/tests are recommended: basic metabolic panel, LFTS, lipase.    See dentist regarding Nonspecific 1.6 cm cystic lesion surrounding the roots of tooth numbers 15 and 16. Differential diagnosis includes periapical cyst, periapical abscess, periapical granuloma, keratocystic odontogenic tumor, or less likely ameloblastoma.     I spent >30 minutes with patient discharge

## 2019-12-08 NOTE — PROGRESS NOTES
Patient is very anxious to go home. He has slept in short intervals, but then wakes and comes out to the nursing station fully dressed and says its time to go home. He is easily directed to the time after he looks out the window. Gave some Ativan,Melotonin, and now Seroquel to see if he will settle down and sleep and anxiety will decrease.

## 2019-12-08 NOTE — PROGRESS NOTES
"[place] [facility] [place] [facility] DISCHARGE NOTE    Patient discharged to home at 12:01 PM via ambulated. Accompanied by father and staff. Discharge instructions reviewed with patient and father, opportunity offered to ask questions. Prescriptions sent to patients preferred pharmacy. All belongings sent with patient. Pt states feeling \"good\"  Pt refilled personal water bottle after MD instructed pt to drink less per day due to low sodium level when admitted. Retaught fluid restriction of 1500 mls/day and how his sodium levels are affected. Encouraged F/U with MD/chemical dependency for HTN and labs and his ETOH and narcotic addictions respectively.    Saundra Wright RN  "

## 2019-12-08 NOTE — PROGRESS NOTES
"Patient came out of his room a number of times throughout the night. He has his days and nights confused. He very much wants to go home. He seems a little angry this morning. \"I don't want them to commit me\". He wants to smoke and he want to watch foot ball today. He is steady on his feet, he is eating drinking and voiding. He denies pain or dizziness. Ativan 1mg, melatonin and Seroquel 25 mg were given within the last 12 hour shift. Sodium level at 0100 was 132, next draw will be at 0700.   "

## 2020-03-15 ENCOUNTER — HEALTH MAINTENANCE LETTER (OUTPATIENT)
Age: 45
End: 2020-03-15

## 2020-07-22 ENCOUNTER — VIRTUAL VISIT (OUTPATIENT)
Dept: UROLOGY | Facility: CLINIC | Age: 45
End: 2020-07-22
Payer: COMMERCIAL

## 2020-07-22 DIAGNOSIS — E29.1 HYPOGONADISM MALE: Primary | ICD-10-CM

## 2020-07-22 PROCEDURE — 99202 OFFICE O/P NEW SF 15 MIN: CPT | Mod: 95 | Performed by: UROLOGY

## 2020-07-22 NOTE — PROGRESS NOTES
Telephone visit    44-year-old male with a history of hypogonadism.    Last recorded total testosterone level 112 ng/dL.    At this time the exact etiology of his hypogonadism is not clear.    Denies past trauma or pelvic surgery.  No history of chemotherapeutic therapy.    No known prior medical issues related to the testicles.    He currently is taking 0.25 mL of 100 mg/mL Depo-testosterone on Tuesdays and Fridays.    Impression: Hypogonadism managed by testosterone supplementation    Plan: Will remeasure his testosterone level with a serum testosterone scheduled for the day of his injection but prior to his testosterone injection to maximize the interval of time between doses of testosterone.    I will review the results of the free and total testosterone when they are available and discussed with him the need for any adjustments in his therapeutic plan.    I will also arrange for him to be seen directly in clinic for further assessment of his ongoing need for testosterone supplementation.    Total time 15 minutes

## 2020-07-28 DIAGNOSIS — E29.1 HYPOGONADISM MALE: ICD-10-CM

## 2020-07-28 PROCEDURE — 84270 ASSAY OF SEX HORMONE GLOBUL: CPT | Performed by: UROLOGY

## 2020-07-28 PROCEDURE — 36415 COLL VENOUS BLD VENIPUNCTURE: CPT | Performed by: UROLOGY

## 2020-07-28 PROCEDURE — 84403 ASSAY OF TOTAL TESTOSTERONE: CPT | Performed by: UROLOGY

## 2020-07-30 LAB
SHBG SERPL-SCNC: 39 NMOL/L (ref 11–80)
TESTOST FREE SERPL-MCNC: 3.04 NG/DL (ref 4.7–24.4)
TESTOST SERPL-MCNC: 179 NG/DL (ref 240–950)

## 2020-07-31 DIAGNOSIS — E29.1 HYPOGONADISM MALE: ICD-10-CM

## 2020-07-31 DIAGNOSIS — E29.1 HYPOGONADISM MALE: Primary | ICD-10-CM

## 2020-07-31 RX ORDER — TESTOSTERONE CYPIONATE 1000 MG/10ML
INJECTION, SOLUTION INTRAMUSCULAR
Qty: 10 ML | Refills: 3 | Status: SHIPPED | OUTPATIENT
Start: 2020-07-31 | End: 2020-07-31

## 2020-07-31 RX ORDER — TESTOSTERONE CYPIONATE 1000 MG/10ML
INJECTION, SOLUTION INTRAMUSCULAR
Qty: 10 ML | Refills: 3 | Status: SHIPPED | OUTPATIENT
Start: 2020-07-31

## 2020-07-31 NOTE — PROGRESS NOTES
Renewed his prescription for double testosterone and slightly increased dose of 0.3 mils twice weekly intramuscular injection.  I also renewed his prescription for syringes and needles.    I will have him come into clinic in about 6 to 8 weeks and also repeat his free testosterone and total testosterone  which are 3.04 ng/dL and 179 ng/dL respectively.

## 2020-09-08 ENCOUNTER — TELEPHONE (OUTPATIENT)
Dept: LAB | Facility: CLINIC | Age: 45
End: 2020-09-08

## 2020-09-14 DIAGNOSIS — E29.1 HYPOGONADISM MALE: ICD-10-CM

## 2020-09-14 DIAGNOSIS — E29.1 HYPOGONADISM MALE: Primary | ICD-10-CM

## 2020-09-14 PROCEDURE — 36415 COLL VENOUS BLD VENIPUNCTURE: CPT | Performed by: UROLOGY

## 2020-09-14 PROCEDURE — 84403 ASSAY OF TOTAL TESTOSTERONE: CPT | Performed by: UROLOGY

## 2020-09-14 PROCEDURE — 84270 ASSAY OF SEX HORMONE GLOBUL: CPT | Performed by: UROLOGY

## 2020-09-16 LAB
SHBG SERPL-SCNC: 38 NMOL/L (ref 11–80)
TESTOST FREE SERPL-MCNC: 15.66 NG/DL (ref 4.7–24.4)
TESTOST SERPL-MCNC: 753 NG/DL (ref 240–950)

## 2021-01-09 ENCOUNTER — NURSE TRIAGE (OUTPATIENT)
Dept: NURSING | Facility: CLINIC | Age: 46
End: 2021-01-09

## 2021-01-10 NOTE — TELEPHONE ENCOUNTER
Airpod rubber piece is stuck in his R ear. Has been in there since yesterday.    Per protocol advised UC evaluation.    Shani Winters RN on 1/9/2021 at 7:27 PM    Additional Information    Negative: MODERATE-SEVERE ear pain    Negative: Button battery    Negative: Sharp foreign body (e.g., glass, pin)    Negative: Bleeding from ear canal    Negative: Caller is unable to remove live insect    [1] Foreign body AND [2] can't remove at home    Protocols used: EAR - FOREIGN BODY-A-AH

## 2021-01-12 ENCOUNTER — OFFICE VISIT (OUTPATIENT)
Dept: FAMILY MEDICINE | Facility: CLINIC | Age: 46
End: 2021-01-12
Payer: COMMERCIAL

## 2021-01-12 VITALS
DIASTOLIC BLOOD PRESSURE: 89 MMHG | RESPIRATION RATE: 16 BRPM | TEMPERATURE: 97.6 F | BODY MASS INDEX: 26.52 KG/M2 | HEART RATE: 114 BPM | WEIGHT: 165 LBS | OXYGEN SATURATION: 97 % | SYSTOLIC BLOOD PRESSURE: 139 MMHG | HEIGHT: 66 IN

## 2021-01-12 DIAGNOSIS — T16.1XXA FOREIGN BODY OF RIGHT EAR, INITIAL ENCOUNTER: Primary | ICD-10-CM

## 2021-01-12 PROCEDURE — 99212 OFFICE O/P EST SF 10 MIN: CPT | Performed by: FAMILY MEDICINE

## 2021-01-12 ASSESSMENT — MIFFLIN-ST. JEOR: SCORE: 1586.19

## 2021-01-12 NOTE — PROGRESS NOTES
"  Assessment & Plan     Foreign body of right ear, initial encounter  -No foreign body identified in the right ear.  No signs of infection of the right ear.  Spoke with patient if he develops pain, drainage, hearing loss that she return to be further evaluated. All questions answered.     Follow up as needed.     Juan Francisco Stahl DO  [facility] is a 45 year old who presents to clinic today for the following health issues     HPI     Ear foreign body concern  Patient lost his right earbud on Saturday and he was unable to find it and is concerned that it is still in his right ear.  He has no ear pain, discharge or hearing loss. No recent fevers. He would like his ear to be evaluated to see if his ear bud is still in his right ear.    Review of Systems   Constitutional, HEENT, cardiovascular, pulmonary, gi and gu systems are negative, except as otherwise noted.      Objective    /89 (BP Location: Left arm, Patient Position: Chair, Cuff Size: Adult Regular)   Pulse 114   Temp 97.6  F (36.4  C) (Tympanic)   Resp 16   Ht 1.676 m (5' 6\")   Wt 74.8 kg (165 lb)   SpO2 97%   BMI 26.63 kg/m    Body mass index is 26.63 kg/m .  Physical Exam   General: Alert, cooperative no acute distress  Ears: Left ear: external ear normal, external canal patent, TM intact with no signs of erythema or infection.  Right ear: External ear normal, nontender with manipulation, external canal patent, TM intact with no signs of erythema or infection. no foreign body identified.      "

## 2021-01-12 NOTE — PATIENT INSTRUCTIONS
Thank you for choosing [facility].  You may be receiving an email and/or telephone survey request from [facility] Customer Experience regarding your visit today.  Please take a few minutes to respond to the survey to let us know how we are doing.      If you have questions or concerns, please contact us via echoBase or you can contact your care team at [phone].    Our Clinic hours are:  Monday 6:40 am  to 7:00 pm  Tuesday -Friday 6:40 am to 5:00 pm    The [place] outpatient lab hours are:  Monday - Friday 6:10 am to 4:45 pm  Saturdays 7:00 am to 11:00 am  Appointments are required, call [phone]    If you have clinical questions after hours or would like to schedule an appointment,  call the clinic at [phone].

## 2021-05-09 ENCOUNTER — HEALTH MAINTENANCE LETTER (OUTPATIENT)
Age: 46
End: 2021-05-09

## 2021-10-24 ENCOUNTER — HEALTH MAINTENANCE LETTER (OUTPATIENT)
Age: 46
End: 2021-10-24

## 2022-06-05 ENCOUNTER — HEALTH MAINTENANCE LETTER (OUTPATIENT)
Age: 47
End: 2022-06-05

## 2022-10-15 ENCOUNTER — HEALTH MAINTENANCE LETTER (OUTPATIENT)
Age: 47
End: 2022-10-15

## 2023-03-25 ENCOUNTER — HOSPITAL ENCOUNTER (EMERGENCY)
Facility: CLINIC | Age: 48
Discharge: HOME | End: 2023-03-26
Attending: EMERGENCY MEDICINE | Admitting: EMERGENCY MEDICINE
Payer: COMMERCIAL

## 2023-03-25 ENCOUNTER — NURSE TRIAGE (OUTPATIENT)
Dept: NURSING | Facility: CLINIC | Age: 48
End: 2023-03-25
Payer: COMMERCIAL

## 2023-03-25 DIAGNOSIS — S01.21XA LACERATION OF NOSE, INITIAL ENCOUNTER: ICD-10-CM

## 2023-03-25 PROCEDURE — 99283 EMERGENCY DEPT VISIT LOW MDM: CPT | Mod: 25 | Performed by: EMERGENCY MEDICINE

## 2023-03-25 PROCEDURE — 12011 RPR F/E/E/N/L/M 2.5 CM/<: CPT | Performed by: EMERGENCY MEDICINE

## 2023-03-25 PROCEDURE — 99283 EMERGENCY DEPT VISIT LOW MDM: CPT | Performed by: EMERGENCY MEDICINE

## 2023-03-25 ASSESSMENT — ACTIVITIES OF DAILY LIVING (ADL): ADLS_ACUITY_SCORE: 33

## 2023-03-26 VITALS
DIASTOLIC BLOOD PRESSURE: 72 MMHG | HEART RATE: 105 BPM | HEIGHT: 66 IN | SYSTOLIC BLOOD PRESSURE: 101 MMHG | RESPIRATION RATE: 20 BRPM | OXYGEN SATURATION: 99 % | TEMPERATURE: 98.5 F | BODY MASS INDEX: 24.11 KG/M2 | WEIGHT: 150 LBS

## 2023-03-26 PROCEDURE — 90715 TDAP VACCINE 7 YRS/> IM: CPT | Performed by: EMERGENCY MEDICINE

## 2023-03-26 PROCEDURE — 250N000011 HC RX IP 250 OP 636: Performed by: EMERGENCY MEDICINE

## 2023-03-26 PROCEDURE — 90471 IMMUNIZATION ADMIN: CPT | Performed by: EMERGENCY MEDICINE

## 2023-03-26 RX ORDER — ONDANSETRON 4 MG/1
4 TABLET, ORALLY DISINTEGRATING ORAL ONCE
Status: COMPLETED | OUTPATIENT
Start: 2023-03-26 | End: 2023-03-26

## 2023-03-26 RX ADMIN — CLOSTRIDIUM TETANI TOXOID ANTIGEN (FORMALDEHYDE INACTIVATED), CORYNEBACTERIUM DIPHTHERIAE TOXOID ANTIGEN (FORMALDEHYDE INACTIVATED), BORDETELLA PERTUSSIS TOXOID ANTIGEN (GLUTARALDEHYDE INACTIVATED), BORDETELLA PERTUSSIS FILAMENTOUS HEMAGGLUTININ ANTIGEN (FORMALDEHYDE INACTIVATED), BORDETELLA PERTUSSIS PERTACTIN ANTIGEN, AND BORDETELLA PERTUSSIS FIMBRIAE 2/3 ANTIGEN 0.5 ML: 5; 2; 2.5; 5; 3; 5 INJECTION, SUSPENSION INTRAMUSCULAR at 00:24

## 2023-03-26 RX ADMIN — ONDANSETRON 4 MG: 4 TABLET, ORALLY DISINTEGRATING ORAL at 00:02

## 2023-03-26 ASSESSMENT — ENCOUNTER SYMPTOMS
FEVER: 0
LIGHT-HEADEDNESS: 0
SHORTNESS OF BREATH: 0
HEADACHES: 0
BACK PAIN: 0
APPETITE CHANGE: 0
NECK PAIN: 0
WOUND: 1
ABDOMINAL PAIN: 0
NAUSEA: 1

## 2023-03-26 ASSESSMENT — ACTIVITIES OF DAILY LIVING (ADL)
ADLS_ACUITY_SCORE: 35

## 2023-03-26 NOTE — ED NOTES
Pt had an emesis with 400 ml out. Pt denied HA, visual changes, cervical neck pain, Abd, CP pain. Pt did report drinking alcohol tonight and reports drinking 2-3 days per week with 4 drinks at that time. Pt drinks hard liquor and has gone through withdrawals before. Denied seizure wd.     Writer cleaned lacerations.

## 2023-03-26 NOTE — TELEPHONE ENCOUNTER
Reported he fell and gashed his nose. Denies fainting dizziness, but tripped on stairs.  Denies bleeding now.    Disposition to go to ER.  Verbalized understanding of care advice and will think about going to Er but may want to wait until tomorrow.    Dedra Holt RN  [facility]      Reason for Disposition    Skin is split open or gaping  (or length > 1/4 inch or 6 mm)    Additional Information    Negative: [1] Major bleeding (e.g., actively dripping or spurting) AND [2] can't be stopped    Negative: Fainted or too weak to stand following large blood loss    Negative: Knocked out (unconscious) > 1 minute    Negative: Difficult to awaken or acting confused (e.g., disoriented, slurred speech)    Negative: Severe neck pain    Negative: Sounds like a life-threatening emergency to the triager    Negative: [1] Nosebleed AND [2] won't stop after 10 minutes of pinching the nostrils closed (applied twice)    Negative: [1] Bleeding from wound AND [2] won't stop after 10 minutes of direct pressure (using correct technique)    Protocols used: NOSE INJURY-A-AH

## 2023-03-26 NOTE — ED TRIAGE NOTES
"At home tonight. States: \"My house is zaida messed up. I have a wood stair. The stair is loose and if you step on it you can fall. I fell. Not sure if I hit the stair or the wall.\"    There is a laceration to the bridge of the nose. Also, the nose has an obvious displaceemnt to the right.     Admits: \"I drank like 2 drinks tonight.\" He is slurring his words.     He was requested to not vape in the triage room.     When asked if the nose deformity is historic. \"My nosed is messed up. No way.\"      "

## 2023-03-26 NOTE — ED PROVIDER NOTES
History     Chief Complaint   Patient presents with     Facial Laceration     Fall     Facial Injury     HPI  Lester Winchester is a 47 year old male with no specific contributing past medical history presented for evaluation of accidental fall.  Patient reports he was drinking some alcohol tonight when he tripped on a loose step causing him to fall forward and strike his nose on the floor.  Denies loss of consciousness.  Did start bleeding and was concerned he may need to get stitches so he came in for evaluation.  Denies any significant pain in the nose.  Denies headache or lightheadedness.  Does have some mild nausea.  Patient does not know his last tetanus shot but believes it is up-to-date.    ==================================================================    CHART REVIEW:      Td/Tdap  03/23/2015  1 of 4  Boostrix 10+ yrs  Full          END CHART REVIEW  ==================================================================        Allergies:  No Known Allergies    Problem List:    Patient Active Problem List    Diagnosis Date Noted     Chronic pain disorder 12/06/2019     Priority: High     Alcohol withdrawal (H) 12/06/2019     Priority: Medium     Narcotic dependence (H) 12/06/2019     Priority: Medium     LFT elevation 12/06/2019     Priority: Medium     Left maxillary sinus opacification 12/06/2019     Priority: Medium     Dental root cyst 12/06/2019     Priority: Medium     Benign essential hypertension 12/06/2019     Priority: Medium     Anxiety disorder 12/06/2019     Priority: Medium     Hyponatremia 12/06/2019     Priority: Medium     Thrombocytopenia (H) 12/06/2019     Priority: Medium     Normocytic anemia 12/06/2019     Priority: Medium     Alcohol abuse 12/06/2019     Priority: Medium     Seizure (H) 12/05/2019     Priority: Medium     Tobacco use disorder 01/18/2016     Priority: Medium     Pain medication agreement 03/05/2012     Priority: Medium     Weaned off oxycodone 2014 d/t failure to pursue other  "evaluation.       Agoraphobia 06/18/2010     Priority: Medium     Chronic back pain 12/06/2019     Priority: Low        Past Medical History:    Past Medical History:   Diagnosis Date     History of stroke 1985       Past Surgical History:    No past surgical history on file.    Family History:    Family History   Problem Relation Age of Onset     Mental Illness Father      Alcoholism Maternal Grandfather        Social History:  Marital Status:  Single [1]  Social History     Tobacco Use     Smoking status: Every Day     Packs/day: 0.50     Years: 25.00     Pack years: 12.50     Types: Cigarettes     Smokeless tobacco: Never   Substance Use Topics     Alcohol use: Yes     Comment: Reports 2 [employer] and 2-3 beers per day     Drug use: Yes     Types: Marijuana     Comment: On suboxone        Medications:    buprenorphine-naloxone (SUBOXONE) 8-2 MG SUBL sublingual tablet  FLUoxetine (PROZAC) 20 MG capsule  folic acid (FOLVITE) 1 MG tablet  lisinopril-hydrochlorothiazide (PRINZIDE/ZESTORETIC) 20-12.5 MG tablet  multivitamin w/minerals (THERA-VIT-M) tablet  nortriptyline (PAMELOR) 10 MG capsule  testosterone cypionate (DEPOTESTOSTERONE) 100 MG/ML injection  vitamin B1 (THIAMINE) 100 MG tablet  Vitamin D, Cholecalciferol, 25 MCG (1000 UT) TABS          Review of Systems   Constitutional: Negative for appetite change and fever.   Eyes: Negative for visual disturbance.   Respiratory: Negative for shortness of breath.    Cardiovascular: Negative for chest pain.   Gastrointestinal: Positive for nausea. Negative for abdominal pain.   Musculoskeletal: Negative for back pain and neck pain.   Skin: Positive for wound (nose).   Neurological: Negative for light-headedness and headaches.   All other systems reviewed and are negative.      Physical Exam   BP: 107/73  Pulse: 120  Temp: 98.4  F (36.9  C)  Resp: 18  Height: 167.6 cm (5' 6\")  Weight: 68 kg (150 lb)  SpO2: 97 %      Physical Exam  Vitals and nursing note reviewed. "   Constitutional:       Appearance: Normal appearance. He is not ill-appearing or diaphoretic.   HENT:      Nose:      Comments: 2 small lacerations on the bridge of the nose.  Nose is nontender and not significantly swollen.  See photo     Mouth/Throat:      Mouth: Mucous membranes are moist.   Eyes:      Conjunctiva/sclera: Conjunctivae normal.   Cardiovascular:      Rate and Rhythm: Normal rate.      Pulses: Normal pulses.   Pulmonary:      Effort: Pulmonary effort is normal.   Musculoskeletal:      Cervical back: Normal range of motion.      Comments: Moving all extremities equally   Skin:     General: Skin is warm and dry.      Capillary Refill: Capillary refill takes less than 2 seconds.   Neurological:      Mental Status: He is alert and oriented to person, place, and time.   Psychiatric:         Mood and Affect: Mood normal.             ED Course                 [facility]    -Laceration Repair    Date/Time: 3/26/2023 12:38 AM  Performed by: Usama Issa MD  Authorized by: Usama Issa MD     Risks, benefits and alternatives discussed.      ANESTHESIA (see MAR for exact dosages):     Anesthesia method:  None  LACERATION DETAILS     Location:  Face    Face location:  Nose    Length (cm):  0.8    Depth (mm):  3    REPAIR TYPE:     Repair type:  Simple        TREATMENT:     Area cleansed with:  Saline    Amount of cleaning:  Standard    SKIN REPAIR     Repair method:  Steri-Strips and tissue adhesive    Number of Steri-Strips:  3    APPROXIMATION     Approximation:  Close    PROCEDURE    Patient Tolerance:  Patient tolerated the procedure well with [facility]    -Laceration Repair    Date/Time: 3/26/2023 12:38 AM  Performed by: Usama Issa MD  Authorized by: Usama Issa MD     Risks, benefits and alternatives discussed.      ANESTHESIA (see MAR for exact dosages):     Anesthesia  method:  None  LACERATION DETAILS     Location:  Face    Face location:  Nose    Length (cm):  0.5    Depth (mm):  1    REPAIR TYPE:     Repair type:  Simple        TREATMENT:     Area cleansed with:  Saline    Amount of cleaning:  Standard    SKIN REPAIR     Repair method:  Steri-Strips and tissue adhesive    Number of Steri-Strips:  2    APPROXIMATION     Approximation:  Close    POST-PROCEDURE DETAILS     Dressing:  Open (no dressing)        PROCEDURE    Patient Tolerance:  Patient tolerated the procedure well with no immediate complications                    No results found for this or any previous visit (from the past 24 hour(s)).    Medications   ondansetron (ZOFRAN ODT) ODT tab 4 mg (4 mg Oral $Given 3/26/23 0002)   Tdap (tetanus-diphtheria-acell pertussis) (ADACEL) injection 0.5 mL (0.5 mLs Intramuscular $Given 3/26/23 0024)     Patient admits to alcohol use tonight and admits to still feeling intoxicated.  Patient willing to stay here and not drive home to allow him to sober up and be safe.    6:28 AM Patient re-assessed: Patient resting company.  Awake and alert.  No slurred speech.  Ambulated in the department with a steady gait.  No smell of alcohol on the breath.  Patient states he feels sober and patient clinically appears sober at this time.  Plan to discharge home at this time with follow-up as needed.    Assessments & Plan (with Medical Decision Making)  47-year-old male present for evaluation of a stumbling fall and laceration to his nose.  Patient was intoxicated tonight and tripped falling hitting his nose.  No loss conscious.  Drove himself here for evaluation.  On exam he does have 2 small lacerations on his nose.  No evidence of nasal fracture.  Denies headache or symptoms of concussion but did have initial evidence of some intoxication.  No neck pain or neurologic symptoms.  Nasal laceration cleaned and repaired.  Due to patient's intoxication, he was monitored in the ED overnight.  On  repeat exam patient was clinically sober and felt much better.  Was discharged home with return precautions.     I have reviewed the nursing notes.    I have reviewed the findings, diagnosis, plan and need for follow up with the patient.             New Prescriptions    No medications on file       Final diagnoses:   Laceration of nose, initial encounter       3/25/2023   [facility] EMERGENCY DEPT     Issa, Usama Eng MD  03/26/23 0693

## 2023-09-27 ENCOUNTER — TELEPHONE (OUTPATIENT)
Dept: UROLOGY | Facility: CLINIC | Age: 48
End: 2023-09-27
Payer: COMMERCIAL

## 2023-09-27 NOTE — TELEPHONE ENCOUNTER
Per Indy Nash, she will not replace his testosterone is that is what he is looking for at his appointment with her. Per Indy, she can check his levels, but will not be replacing testosterone. Per Indy, either family practice or endocrinology can take care of the replacement.     Called and spoke to patient and informed him that Dr. Rosenthal is on medical leave and may not return to clinic. Informed patient that he was one of the only urologists that will replace testosterone, therefore patient is recommended to go to his primary or get a referral to endocrinology. Patient verbalized understanding and will talk to his PCP in [place]. Patient requested appointment with Indy to be cancelled.    Ngozi CHOWDHURY CMA 09/27/23 1:12 PM

## 2023-10-29 ENCOUNTER — HEALTH MAINTENANCE LETTER (OUTPATIENT)
Age: 48
End: 2023-10-29

## 2024-12-21 ENCOUNTER — HEALTH MAINTENANCE LETTER (OUTPATIENT)
Age: 49
End: 2024-12-21